# Patient Record
Sex: MALE | Race: WHITE | Employment: OTHER | URBAN - METROPOLITAN AREA
[De-identification: names, ages, dates, MRNs, and addresses within clinical notes are randomized per-mention and may not be internally consistent; named-entity substitution may affect disease eponyms.]

---

## 2018-01-01 ENCOUNTER — HOSPITAL ENCOUNTER (OUTPATIENT)
Dept: LAB | Age: 68
Discharge: HOME | End: 2018-12-21
Payer: MEDICARE

## 2018-01-01 ENCOUNTER — HOSPITAL ENCOUNTER (OUTPATIENT)
Dept: LAB | Age: 68
Discharge: HOME | End: 2018-11-06
Payer: MEDICARE

## 2018-01-01 ENCOUNTER — HOSPITAL ENCOUNTER (OUTPATIENT)
Dept: LAB | Age: 68
Discharge: HOME | End: 2018-12-05
Payer: MEDICARE

## 2018-01-01 DIAGNOSIS — I50.22 CHRONIC SYSTOLIC HEART FAILURE (HCC): ICD-10-CM

## 2018-01-01 DIAGNOSIS — I10 ESSENTIAL HYPERTENSION: ICD-10-CM

## 2018-01-01 DIAGNOSIS — I48.19 PERSISTENT ATRIAL FIBRILLATION (HCC): ICD-10-CM

## 2018-01-01 DIAGNOSIS — Z79.899 ENCOUNTER FOR LONG-TERM (CURRENT) USE OF HIGH-RISK MEDICATION: ICD-10-CM

## 2018-01-01 DIAGNOSIS — I95.1 ORTHOSTATIC HYPOTENSION: ICD-10-CM

## 2018-01-01 DIAGNOSIS — N18.30 CHRONIC KIDNEY DISEASE, STAGE III (MODERATE) (HCC): ICD-10-CM

## 2018-01-01 DIAGNOSIS — N18.9 CHRONIC KIDNEY DISEASE, UNSPECIFIED CKD STAGE: ICD-10-CM

## 2018-01-01 LAB
ANION GAP SERPL CALC-SCNC: 5 MMOL/L
ANION GAP SERPL CALC-SCNC: 7 MMOL/L
ANION GAP SERPL CALC-SCNC: 8 MMOL/L
BNP SERPL-MCNC: 1091 PG/ML
BNP SERPL-MCNC: 2552 PG/ML
BUN SERPL-MCNC: 43 MG/DL (ref 8–23)
BUN SERPL-MCNC: 46 MG/DL (ref 8–23)
BUN SERPL-MCNC: 47 MG/DL (ref 8–23)
CALCIUM SERPL-MCNC: 8.4 MG/DL (ref 8.3–10.4)
CALCIUM SERPL-MCNC: 8.7 MG/DL (ref 8.3–10.4)
CALCIUM SERPL-MCNC: 8.8 MG/DL (ref 8.3–10.4)
CHLORIDE SERPL-SCNC: 105 MMOL/L (ref 98–107)
CHLORIDE SERPL-SCNC: 107 MMOL/L (ref 98–107)
CHLORIDE SERPL-SCNC: 111 MMOL/L (ref 98–107)
CO2 SERPL-SCNC: 27 MMOL/L (ref 21–32)
CO2 SERPL-SCNC: 28 MMOL/L (ref 21–32)
CO2 SERPL-SCNC: 30 MMOL/L (ref 21–32)
CREAT SERPL-MCNC: 1.7 MG/DL (ref 0.8–1.5)
CREAT SERPL-MCNC: 1.9 MG/DL (ref 0.8–1.5)
CREAT SERPL-MCNC: 2 MG/DL (ref 0.8–1.5)
GLUCOSE SERPL-MCNC: 102 MG/DL (ref 65–100)
GLUCOSE SERPL-MCNC: 61 MG/DL (ref 65–100)
GLUCOSE SERPL-MCNC: 92 MG/DL (ref 65–100)
POTASSIUM SERPL-SCNC: 3.9 MMOL/L (ref 3.5–5.1)
POTASSIUM SERPL-SCNC: 4 MMOL/L (ref 3.5–5.1)
POTASSIUM SERPL-SCNC: 4.7 MMOL/L (ref 3.5–5.1)
SODIUM SERPL-SCNC: 140 MMOL/L (ref 136–145)
SODIUM SERPL-SCNC: 142 MMOL/L (ref 136–145)
SODIUM SERPL-SCNC: 146 MMOL/L (ref 136–145)

## 2018-01-01 PROCEDURE — 36415 COLL VENOUS BLD VENIPUNCTURE: CPT

## 2018-01-01 PROCEDURE — 83880 ASSAY OF NATRIURETIC PEPTIDE: CPT

## 2018-01-01 PROCEDURE — 80048 BASIC METABOLIC PNL TOTAL CA: CPT

## 2018-02-01 ENCOUNTER — HOSPITAL ENCOUNTER (OUTPATIENT)
Dept: LAB | Age: 68
Discharge: HOME | End: 2018-02-01
Payer: MEDICARE

## 2018-02-01 DIAGNOSIS — N18.30 STAGE 3 CHRONIC KIDNEY DISEASE (HCC): ICD-10-CM

## 2018-02-01 DIAGNOSIS — R07.89 OTHER CHEST PAIN: ICD-10-CM

## 2018-02-01 PROBLEM — I48.19 PERSISTENT ATRIAL FIBRILLATION (HCC): Status: ACTIVE | Noted: 2018-02-01

## 2018-02-01 LAB
ANION GAP SERPL CALC-SCNC: 6 MMOL/L
BASOPHILS # BLD: 0 K/UL (ref 0–0.2)
BASOPHILS NFR BLD: 0 % (ref 0–2)
BUN SERPL-MCNC: 35 MG/DL (ref 8–23)
CALCIUM SERPL-MCNC: 8.5 MG/DL (ref 8.3–10.4)
CHLORIDE SERPL-SCNC: 107 MMOL/L (ref 98–107)
CO2 SERPL-SCNC: 29 MMOL/L (ref 21–32)
CREAT SERPL-MCNC: 1.7 MG/DL (ref 0.8–1.5)
DIFFERENTIAL METHOD BLD: ABNORMAL
EOSINOPHIL # BLD: 0.2 K/UL (ref 0–0.8)
EOSINOPHIL NFR BLD: 2 % (ref 0.5–7.8)
ERYTHROCYTE [DISTWIDTH] IN BLOOD BY AUTOMATED COUNT: 13 % (ref 11.9–14.6)
GLUCOSE SERPL-MCNC: 105 MG/DL (ref 65–100)
HCT VFR BLD AUTO: 41.2 % (ref 41.1–50.3)
HGB BLD-MCNC: 14.4 G/DL (ref 13.6–17.2)
LYMPHOCYTES # BLD: 1.1 K/UL (ref 0.5–4.6)
LYMPHOCYTES NFR BLD: 14 % (ref 13–44)
MCH RBC QN AUTO: 32.7 PG (ref 26.1–32.9)
MCHC RBC AUTO-ENTMCNC: 35 G/DL (ref 31.4–35)
MCV RBC AUTO: 93.4 FL (ref 79.6–97.8)
MONOCYTES # BLD: 1 K/UL (ref 0.1–1.3)
MONOCYTES NFR BLD: 13 % (ref 4–12)
NEUTS SEG # BLD: 5.1 K/UL (ref 1.7–8.2)
NEUTS SEG NFR BLD: 71 % (ref 43–78)
PLATELET # BLD AUTO: 152 K/UL (ref 150–450)
PMV BLD AUTO: 10.8 FL (ref 10.8–14.1)
POTASSIUM SERPL-SCNC: 4 MMOL/L (ref 3.5–5.1)
RBC # BLD AUTO: 4.41 M/UL (ref 4.23–5.67)
SODIUM SERPL-SCNC: 142 MMOL/L (ref 136–145)
WBC # BLD AUTO: 7.3 K/UL (ref 4.3–11.1)

## 2018-02-01 PROCEDURE — 36415 COLL VENOUS BLD VENIPUNCTURE: CPT | Performed by: INTERNAL MEDICINE

## 2018-02-01 PROCEDURE — 80048 BASIC METABOLIC PNL TOTAL CA: CPT | Performed by: INTERNAL MEDICINE

## 2018-02-01 PROCEDURE — 85025 COMPLETE CBC W/AUTO DIFF WBC: CPT | Performed by: INTERNAL MEDICINE

## 2018-02-02 NOTE — PROGRESS NOTES
Left message on voice mail for Paula Sahni at [facility] that Dr. Sorensen Courts requests pt arrive early for cath for hydration due to elevated creatine. Requested Paula Sahni call this triage nurse to confirm that she received message. Advised pt of need to arrive early for cath due to elevated creatinine. Advised pt that someone will be calling to give him further instructions for 2/5/18 cath. Pt verbalized understanding.

## 2018-02-02 NOTE — PROGRESS NOTES
Obinna Upton in [facility] of ana Rodríguez Ven[place] verbalized understanding, and states pt's cath has been moved to 12:30 pm on 2/5/18. Pt should arrive at 7:00 am for hydration. Advised pt to arrive at [facility] at 7:00 an ib 2/5/18 for hydration prior to 12:30 pm cath. Pt verbalized understanding.

## 2018-02-03 NOTE — PROGRESS NOTES
Patient pre-assessment complete for [facility] poss with Dr Marilyn Hester scheduled for 18 at 12:30, arrival time 7am - HYDRATION. Patient verified using . Patient instructed to bring all home medications in labeled bottles on the day of procedure. NPO status reinforced. Patient informed to take a full dose aspirin 325mg  or 81 mg x 4 on the day of procedure. Patient instructed to HOLD spironolactone on Monday am. Instructed they can take all other medications excluding vitamins & supplements. Patient verbalizes understanding of all instructions & denies any questions at this time.

## 2018-02-05 ENCOUNTER — HOSPITAL ENCOUNTER (OUTPATIENT)
Dept: CARDIAC CATH/INVASIVE PROCEDURES | Age: 68
Discharge: HOME | End: 2018-02-05
Attending: INTERNAL MEDICINE | Admitting: INTERNAL MEDICINE
Payer: MEDICARE

## 2018-02-05 VITALS
HEART RATE: 90 BPM | SYSTOLIC BLOOD PRESSURE: 149 MMHG | HEIGHT: 70 IN | OXYGEN SATURATION: 95 % | DIASTOLIC BLOOD PRESSURE: 88 MMHG | RESPIRATION RATE: 14 BRPM | WEIGHT: 187 LBS | TEMPERATURE: 97.8 F | BODY MASS INDEX: 26.77 KG/M2

## 2018-02-05 LAB
ANION GAP SERPL CALC-SCNC: 11 MMOL/L (ref 7–16)
BUN SERPL-MCNC: 28 MG/DL (ref 8–23)
CALCIUM SERPL-MCNC: 8.4 MG/DL (ref 8.3–10.4)
CHLORIDE SERPL-SCNC: 108 MMOL/L (ref 98–107)
CO2 SERPL-SCNC: 23 MMOL/L (ref 21–32)
CREAT SERPL-MCNC: 1.55 MG/DL (ref 0.8–1.5)
GLUCOSE SERPL-MCNC: 106 MG/DL (ref 65–100)
POTASSIUM SERPL-SCNC: 4.1 MMOL/L (ref 3.5–5.1)
SODIUM SERPL-SCNC: 142 MMOL/L (ref 136–145)

## 2018-02-05 PROCEDURE — 85347 COAGULATION TIME ACTIVATED: CPT

## 2018-02-05 PROCEDURE — 77030019569 HC BND COMPR RAD TERU -B

## 2018-02-05 PROCEDURE — 80048 BASIC METABOLIC PNL TOTAL CA: CPT | Performed by: INTERNAL MEDICINE

## 2018-02-05 PROCEDURE — 74011250636 HC RX REV CODE- 250/636

## 2018-02-05 PROCEDURE — 74011250636 HC RX REV CODE- 250/636: Performed by: INTERNAL MEDICINE

## 2018-02-05 PROCEDURE — 93571 IV DOP VEL&/PRESS C FLO 1ST: CPT

## 2018-02-05 PROCEDURE — C1769 GUIDE WIRE: HCPCS

## 2018-02-05 PROCEDURE — 93458 L HRT ARTERY/VENTRICLE ANGIO: CPT

## 2018-02-05 PROCEDURE — 93572 IV DOP VEL&/PRESS C FLO EA: CPT

## 2018-02-05 PROCEDURE — 77030015766

## 2018-02-05 PROCEDURE — C1894 INTRO/SHEATH, NON-LASER: HCPCS

## 2018-02-05 PROCEDURE — 74011000250 HC RX REV CODE- 250: Performed by: INTERNAL MEDICINE

## 2018-02-05 PROCEDURE — 99153 MOD SED SAME PHYS/QHP EA: CPT

## 2018-02-05 PROCEDURE — 99152 MOD SED SAME PHYS/QHP 5/>YRS: CPT

## 2018-02-05 PROCEDURE — C1887 CATHETER, GUIDING: HCPCS

## 2018-02-05 PROCEDURE — 74011636320 HC RX REV CODE- 636/320: Performed by: INTERNAL MEDICINE

## 2018-02-05 PROCEDURE — 77030004534 HC CATH ANGI DX INFN CARD -A

## 2018-02-05 RX ORDER — ACETAMINOPHEN 325 MG/1
650 TABLET ORAL
Status: CANCELLED | OUTPATIENT
Start: 2018-02-05

## 2018-02-05 RX ORDER — SODIUM CHLORIDE 9 MG/ML
1 INJECTION, SOLUTION INTRAVENOUS AS NEEDED
Status: DISCONTINUED | OUTPATIENT
Start: 2018-02-05 | End: 2018-02-05 | Stop reason: HOSPADM

## 2018-02-05 RX ORDER — FENTANYL CITRATE 50 UG/ML
25-100 INJECTION, SOLUTION INTRAMUSCULAR; INTRAVENOUS
Status: DISCONTINUED | OUTPATIENT
Start: 2018-02-05 | End: 2018-02-05 | Stop reason: HOSPADM

## 2018-02-05 RX ORDER — HEPARIN SODIUM 200 [USP'U]/100ML
3 INJECTION, SOLUTION INTRAVENOUS CONTINUOUS
Status: DISCONTINUED | OUTPATIENT
Start: 2018-02-05 | End: 2018-02-05 | Stop reason: HOSPADM

## 2018-02-05 RX ORDER — HEPARIN SODIUM 10000 [USP'U]/ML
40-80 INJECTION, SOLUTION INTRAVENOUS; SUBCUTANEOUS
Status: DISCONTINUED | OUTPATIENT
Start: 2018-02-05 | End: 2018-02-05 | Stop reason: HOSPADM

## 2018-02-05 RX ORDER — SODIUM CHLORIDE 0.9 % (FLUSH) 0.9 %
5-10 SYRINGE (ML) INJECTION AS NEEDED
Status: CANCELLED | OUTPATIENT
Start: 2018-02-05

## 2018-02-05 RX ORDER — METOPROLOL TARTRATE 5 MG/5ML
2.5-5 INJECTION INTRAVENOUS
Status: DISCONTINUED | OUTPATIENT
Start: 2018-02-05 | End: 2018-02-05 | Stop reason: HOSPADM

## 2018-02-05 RX ORDER — SODIUM CHLORIDE 0.9 % (FLUSH) 0.9 %
5-10 SYRINGE (ML) INJECTION EVERY 8 HOURS
Status: CANCELLED | OUTPATIENT
Start: 2018-02-05

## 2018-02-05 RX ORDER — DIAZEPAM 5 MG/1
5 TABLET ORAL ONCE
Status: DISCONTINUED | OUTPATIENT
Start: 2018-02-05 | End: 2018-02-05 | Stop reason: HOSPADM

## 2018-02-05 RX ORDER — SODIUM CHLORIDE 9 MG/ML
75 INJECTION, SOLUTION INTRAVENOUS CONTINUOUS
Status: CANCELLED | OUTPATIENT
Start: 2018-02-05

## 2018-02-05 RX ORDER — LIDOCAINE HYDROCHLORIDE 20 MG/ML
1-20 INJECTION, SOLUTION INFILTRATION; PERINEURAL
Status: DISCONTINUED | OUTPATIENT
Start: 2018-02-05 | End: 2018-02-05 | Stop reason: HOSPADM

## 2018-02-05 RX ORDER — HYDROCODONE BITARTRATE AND ACETAMINOPHEN 5; 325 MG/1; MG/1
1 TABLET ORAL
Status: CANCELLED | OUTPATIENT
Start: 2018-02-05

## 2018-02-05 RX ORDER — MIDAZOLAM HYDROCHLORIDE 1 MG/ML
.5-5 INJECTION, SOLUTION INTRAMUSCULAR; INTRAVENOUS
Status: DISCONTINUED | OUTPATIENT
Start: 2018-02-05 | End: 2018-02-05 | Stop reason: HOSPADM

## 2018-02-05 RX ORDER — ONDANSETRON 2 MG/ML
4 INJECTION INTRAMUSCULAR; INTRAVENOUS
Status: CANCELLED | OUTPATIENT
Start: 2018-02-05

## 2018-02-05 RX ORDER — GUAIFENESIN 100 MG/5ML
81-324 LIQUID (ML) ORAL ONCE
Status: DISCONTINUED | OUTPATIENT
Start: 2018-02-05 | End: 2018-02-05 | Stop reason: HOSPADM

## 2018-02-05 RX ADMIN — METOPROLOL TARTRATE 5 MG: 5 INJECTION INTRAVENOUS at 13:34

## 2018-02-05 RX ADMIN — FENTANYL CITRATE 25 MCG: 50 INJECTION, SOLUTION INTRAMUSCULAR; INTRAVENOUS at 13:30

## 2018-02-05 RX ADMIN — SODIUM CHLORIDE 1 ML/KG/HR: 900 INJECTION, SOLUTION INTRAVENOUS at 07:24

## 2018-02-05 RX ADMIN — IOPAMIDOL 75 ML: 755 INJECTION, SOLUTION INTRAVENOUS at 13:58

## 2018-02-05 RX ADMIN — MIDAZOLAM HYDROCHLORIDE 2 MG: 1 INJECTION, SOLUTION INTRAMUSCULAR; INTRAVENOUS at 13:30

## 2018-02-05 RX ADMIN — HEPARIN SODIUM 2 ML: 10000 INJECTION, SOLUTION INTRAVENOUS; SUBCUTANEOUS at 13:57

## 2018-02-05 RX ADMIN — METOPROLOL TARTRATE 5 MG: 5 INJECTION INTRAVENOUS at 13:37

## 2018-02-05 RX ADMIN — LIDOCAINE HYDROCHLORIDE 60 MG: 20 INJECTION, SOLUTION INFILTRATION; PERINEURAL at 13:56

## 2018-02-05 RX ADMIN — HEPARIN 3 ML/HR: 100 SYRINGE at 13:56

## 2018-02-05 RX ADMIN — HEPARIN SODIUM 6000 UNITS: 10000 INJECTION, SOLUTION INTRAVENOUS; SUBCUTANEOUS at 13:39

## 2018-02-05 NOTE — PROGRESS NOTES
TRANSFER - OUT REPORT:    Verbal report given to SHALONDA Matute(name) on Marybeth Parekh  being transferred to cpru(unit) for routine progression of care       Report consisted of patients Situation, Background, Assessment and   Recommendations(SBAR). Information from the following report(s) SBAR was reviewed with the receiving nurse.    has No Known Allergies. Opportunity for questions and clarification was provided. Procedure Summary:Pt had LHC with iFR x 3 via R wrist, site sealed with R band using 12 ml at 1355 hrs.     Med Administration    Versed:  2 mg  Fentanyl: 25 mcg  Heparin: 6000 units      Visit Vitals    /72 (BP 1 Location: Left arm, BP Patient Position: Supine)    Pulse 90    Temp 97.8 °F (36.6 °C)    Resp 14    Ht 5' 10\" (1.778 m)    Wt 84.8 kg (187 lb)    SpO2 97%    BMI 26.83 kg/m2     Past Medical History:   Diagnosis Date    Abnormal stress echo-suggested anterior ischemia 5/12/2011    Arrhythmia     Arthritis     knees    CAD (coronary artery disease)     PCI-2011    Chronic kidney disease     Chronic systolic heart failure (HCC)     CKD (chronic kidney disease)     Dyskinesia 2/17/2016    GERD (gastroesophageal reflux disease)     Heart failure (HCC)     Hepatitis     History of    HTN (hypertension)     Hyperlipidemia     ICD (implantable cardiac defibrillator) in place     [place] dual-chamber ICD impalnted 8/28/12     Ill-defined condition     Parkinsons    Nonischemic cardiomyopathy ([facility] [address] 8/29/2012    Organic hypersomnia, unspecified     Organic sleep apnea, unspecified     Orthostatic hypotension 2/17/2016    MARICHUY (obstructive sleep apnea)     cpap    Parkinson disease ([address]            Peripheral IV 02/05/18 Right Hand (Active)

## 2018-02-05 NOTE — PROGRESS NOTES
Report received from [facility] RN. Procedural findings communicated. Intra procedural  medication administration reviewed. Progression of care discussed.      Patient received into [facility] [place] 4 post sheath removal.     Right Radial access site without bleeding or swelling     TR band dry and intact     Patient instructed to limit movement to right upper extremity    Routine post procedural vital signs and site assessment initiated

## 2018-02-05 NOTE — PROGRESS NOTES
Patient received to [address] [place] [address] room # 4  Ambulatory from [facility]. Patient scheduled for LHC today with Dr Lance Farris. Procedure reviewed & questions answered, voiced good understanding consent obtained & placed on chart. All medications and medical history reviewed. Will prep patient per orders. Patient & family updated on plan of care.

## 2018-02-05 NOTE — PROGRESS NOTES
Discharge instructions given per orders, voiced good understanding of post radial cath care, medications & follow up care.  Denies any questions discharged to home with family

## 2018-02-05 NOTE — PROCEDURES
[address] [facility] CATH    Name:SCHMIDT, Colletta Dess  MR#: [id number]  : 1950  ACCOUNT #: [de-identified]   DATE OF SERVICE: 2018    PROCEDURE:    1. Left heart catheterization, selective coronary arteriography. No left ventriculogram was performed given the need to conserve contrast load in a patient with renal insufficiency. 2.  IFR of LAD. 3.  IFR of left circumflex. 4.  IFR of first obtuse marginal.     INDICATION:    1. Recent episode of chest pain in a patient with known coronary artery disease and remote LAD stenting. Chest pain resolved with nitroglycerin. Concerns of unstable angina. 2.  Assess hemodynamic significance of eccentric lesions of the LAD, obtuse marginal and circumflex. TECHNICAL FACTORS:  After informed consent was obtained, patient was brought to the cardiac catheterization suite. The right radial artery was prepped and draped in the usual sterile fashion. Utilizing modified Seldinger technique and micropuncture needle, the right radial artery was entered. A 6-Salvadorean Terumo slender sheath was placed without difficulty. A radial cocktail consisting of 2000 units of heparin, 2 mg of verapamil and 200 mcg of nitroglycerin was administered. A 5-Salvadorean Tiger 4.0 catheter was used to selectively engage the ostium of left main coronary artery, then right coronary artery respectively. Selective injection in various projections performed. The Tiger catheter was then used to cross the aortic valve and enter the left ventricle. Hemodynamic measurements were obtained. No left ventriculogram was obtained given the patient's renal insufficiency. Left ventricular aortic pressure gradient was obtained by pullback technique. At this point, the patient was given an additional 6000 units of heparin. ACT was 334. XB 3.0 guide was used to selectively engage the ostium of left main coronary artery.   A [email or web address] pressure wire system was advanced after 150 mcg intracoronary nitroglycerin was administered. The wire was placed in the ostium of left main coronary artery and appropriately normalized. This was then placed in the mid LAD. IFR at this point was 0.96 indicating hemodynamically insignificant stenosis of the LAD. The wire was pulled back and again noted to be normalized in the left main coronary artery. This was then placed in the distal circumflex. IFR of the distal circumflex was 1.0. This indicated hemodynamically insignificant stenosis. Wire was then pulled back and again noted to be normalized in the left main coronary artery. This was then placed in the distal first obtuse marginal and IFR was 0.97. Again, this was felt to be a hemodynamically insignificant stenosis. Based on this, PCI was deferred. The wire was removed and final projections were obtained. At this point, the sheath was removed and a pneumatic band was placed with excellent hemostasis. No complications were encountered. SEDATION:  The patient received 2 mg of Versed and 25 mcg of fentanyl. Sedation start time was 1328 with a procedure completion time of 1358. CONTRAST:  Isovue 75 mL. HEMODYNAMIC RESULTS:  1. Aortic pressure 120/77. 2.  Left ventricular end-diastolic pressure is 22.  3.  There is no significant gradient across the aortic valve. ANGIOGRAPHIC RESULTS:  1. Left main coronary artery is a medium-caliber vessel. It appears to be patent. 2.  LAD:  Contains a prior implanted stent. Just proximal to the stent is a 50% stenosis which is smooth in appearance. This is in the proximal vessel. The distal vessel contains 20% luminal irregularities. Stent is widely patent. 3.  First diagonal artery:  Small-caliber vessel, patent. 4.  Second diagonal:  Small-caliber vessel, patent. 5.  Third diagonal artery:  Small-caliber vessel, 20% ostial stenosis.   6.  Left circumflex is a medium-caliber codominant vessel, contains a focal 60% to 70% mid stenosis. The distal vessel is patent. 7.  First obtuse marginal artery is a medium-caliber vessel. It bifurcates into a superior and inferior branch. Superior branch contains 20% stenosis. The inferior branch contains an eccentric 60% stenosis. 8.  Second obtuse marginal artery:  Small-caliber vessel. 9.  Left posterolateral branch 1 and 2 are small-caliber vessels, patent. 10.  Right coronary artery is a medium-caliber, codominant vessel. 20% proximal, 20% mid stenosis. 11.  Right PDA:  Small-caliber vessel, patent. 12.  LV gram:  Not performed. 13. IFR of LAD 0.96.  14.  IFR of left circumflex is 1.0.  15. IFR of first obtuse marginal is 0.97. CONCLUSION:    1.  Multivessel coronary artery disease which is moderate in nature. Not hemodynamically significant based on IFR assessment. 2.  Patent left anterior descending stent. 3. No LV gram was performed given the patient's renal insufficiency. 4.  Successful IFR of LAD with an IFR measurement of 0.96. Successful IFR measurement of left circumflex with IFR measurement of 1.0. Successful IFR measurement of the first obtuse marginal artery with IFR of 0.97. All these measurements were hemodynamically insignificant and medical therapy was recommended.       MD OLGA Martínez / [place]  D: 02/05/2018 14:18     T: 02/05/2018 15:05  JOB #: [id number]  CC: Priscila Barrientos MD

## 2018-02-05 NOTE — DISCHARGE INSTRUCTIONS

## 2018-02-05 NOTE — PROCEDURES
Brief Cardiac Procedure Note    Patient: Samara Lea MRN: [id number]  SSN: [id number]    YOB: 1950  Age: 69 y.o. Sex: male      Date of Procedure: 2/5/2018     Pre-procedure Diagnosis: Chest pain     Post-procedure Diagnosis: Coronary Artery Disease    Procedure: Left Heart Catheterization. IFR    Brief Description of Procedure: As above    Performed By: Elayne Ruff MD     Assistants: None    Anesthesia: Moderate Sedation    Estimated Blood Loss: Less than 10 mL      Specimens: None    Implants: None    Findings: Moderate CAD. Normal IFR LAD, LCx and OM1    Complications: None    Recommendations: Continue medical therapy.     Signed By: Elayne Ruff MD     February 5, 2018

## 2018-02-05 NOTE — IP AVS SNAPSHOT
[address] 
[phone] Patient: Fredrick Isaac MRN: [id number] Discharge Summary 2/5/2018 Fredrick Isaac MRN[de-identified]  [id number] Admission Information Provider Pager Service Admission Date Expected D/C Date Jamil Man MD  CARDIAC CATH LAB 2/5/2018 Actual LOS Patient Class 0 days OUTPATIENT Follow-up Information Follow up With Details Comments Contact Info Jhonny Herrera MD  Follow with Dr Kahlil Guajardo on Monday Fwbruary 12th at 1:45pm in the [place] [facility] [address] [place] [address] 
[phone] My Medications ASK your physician about these medications Instructions Each Dose to Equal  
 Morning Noon Evening Bedtime  
 aspirin delayed-release 81 mg tablet Your last dose was: Your next dose is: Take  by mouth daily. carbidopa-levodopa  mg per tablet Commonly known as:  SINEMET Your last dose was: Your next dose is:    
   
   
 1.5 tabs 8am, 1pm, 6pm and 10pm  
     
   
   
   
  
 carvedilol 6.25 mg tablet Commonly known as:  Delphina [place] Your last dose was: Your next dose is: Take 1 Tab by mouth two (2) times a day. 6.25 mg  
    
   
   
   
  
 clopidogrel 75 mg Tab Commonly known as:  PLAVIX Your last dose was: Your next dose is: Take 1 Tab by mouth daily. 75 mg  
    
   
   
   
  
 cpap machine kit Your last dose was: Your next dose is:    
   
   
 by Does Not Apply route. 10cm  
     
   
   
   
  
 cyanocobalamin 1,000 mcg tablet Your last dose was: Your next dose is: Take 1,000 mcg by mouth daily. 1000 mcg  
    
   
   
   
  
 nitroglycerin 0.4 mg SL tablet Commonly known as:  NITROSTAT Your last dose was: Your next dose is:    
   
   
 1 Tab by SubLINGual route every five (5) minutes as needed. 0.4 mg  
    
   
   
   
  
 pitavastatin calcium 2 mg tablet Commonly known as:  LIVALO Your last dose was: Your next dose is: Take 1 Tab by mouth daily. 2 mg  
    
   
   
   
  
 rasagiline 1 mg tablet Commonly known as:  AZILECT Your last dose was: Your next dose is: Take 1 Tab by mouth daily. 1 mg  
    
   
   
   
  
 spironolactone 25 mg tablet Commonly known as:  ALDACTONE Your last dose was: Your next dose is: Take 0.5 Tabs by mouth daily. 12.5 mg  
    
   
   
   
  
 valsartan 40 mg tablet Commonly known as:  DIOVAN Your last dose was: Your next dose is: Take 0.5 Tabs by mouth daily. 20 mg  
    
   
   
   
  
 VITAMIN D3 2,000 unit Tab Generic drug:  cholecalciferol (vitamin D3) Your last dose was: Your next dose is: Take  by mouth daily. General Information Please provide this summary of care documentation to your next provider. Allergies No Known Allergies Current Immunizations  Reviewed on 3/6/2017 Name Date Influenza High Dose Vaccine PF 1/2/2017, 10/22/2015 Influenza Vaccine 9/24/2013 Pneumococcal Conjugate (PCV-13) 1/18/2016 Pneumococcal Polysaccharide (PPSV-23) 3/6/2017, 1/18/2011 Tdap 12/11/2011 ZZZ-RETIRED (DO NOT USE) Pneumococcal Vaccine (Unspecified Type) 8/29/2010 Zoster Vaccine, Live 12/8/2011 Discharge Instructions Discharge Instructions HEART CATHETERIZATION/ANGIOGRAPHY DISCHARGE INSTRUCTIONS 1. Check puncture site frequently for swelling or bleeding.  If there is any bleeding, apply pressure over the area with a clean towel or washcloth. If you are unable to stop the bleeding in 15-20 minutes call 911. Notify your doctor for any redness, swelling, drainage, or oozing from the puncture site. Notify your doctor for any fever, chills or other signs of infection. 2. If the extremity becomes cold, numb, or painful call [facility] at [phone]. 
3. Activity should be limited for the next 48 hours. Avoid pushing, pulling, or strenuous activity for 48 hours. No heavy lifting (anything over 5 pounds) for 3 days. No driving for 48 hours. 4. You may resume your usual diet. Drink more fluids than usual, water is best. 
5. Have a responsible person drive you home and stay with you for at least 24 hours after your heart catheterization/angiography. 6. You may remove bandage from your right wrist in 24 hours. You may shower in 24 hours. No tub baths, hot tubs, or swimming for 1 week. Do not wash dishes for 1 week. Do not place any lotions, creams, powders, or ointments over puncture site for 1 week. You may place a clean band-aid over the puncture site each day for 5 days. Change daily. I have read the above instructions and have had the opportunity to ask questions. Discharge Orders None  
  
` Patient Signature:  ____________________________________________________________ Date:  ____________________________________________________________  
  
 W[facility] Favorite Provider Signature:  ____________________________________________________________ Date:  ____________________________________________________________

## 2018-02-06 LAB — ACT BLD: 334 SECS (ref 70–128)

## 2018-02-14 NOTE — PROGRESS NOTES
Patient pre-assessment complete for ANNIA-Cardioversion with DR Jenni Marrero scheduled for 2/15/18 at 9am, arrival time 7am. Patient verified using . Patient instructed to bring all home medications in labeled bottles on the day of procedure. NPO status reinforced. Patient instructed to HOLD spironolactone in am. Instructed they can take all other medications excluding vitamins & supplements. Patient verbalizes understanding of all instructions & denies any questions at this time.

## 2018-02-15 ENCOUNTER — ANESTHESIA EVENT (OUTPATIENT)
Dept: SURGERY | Age: 68
End: 2018-02-15
Payer: MEDICARE

## 2018-02-15 ENCOUNTER — ANESTHESIA (OUTPATIENT)
Dept: SURGERY | Age: 68
End: 2018-02-15
Payer: MEDICARE

## 2018-02-15 ENCOUNTER — HOSPITAL ENCOUNTER (OUTPATIENT)
Age: 68
Setting detail: OUTPATIENT SURGERY
Discharge: HOME | End: 2018-02-15
Attending: INTERNAL MEDICINE | Admitting: INTERNAL MEDICINE
Payer: MEDICARE

## 2018-02-15 ENCOUNTER — APPOINTMENT (OUTPATIENT)
Dept: CARDIAC CATH/INVASIVE PROCEDURES | Age: 68
End: 2018-02-15
Payer: MEDICARE

## 2018-02-15 VITALS
DIASTOLIC BLOOD PRESSURE: 73 MMHG | HEIGHT: 70 IN | TEMPERATURE: 97.8 F | BODY MASS INDEX: 26.92 KG/M2 | RESPIRATION RATE: 16 BRPM | SYSTOLIC BLOOD PRESSURE: 115 MMHG | HEART RATE: 70 BPM | WEIGHT: 188 LBS | OXYGEN SATURATION: 92 %

## 2018-02-15 LAB
ANION GAP SERPL CALC-SCNC: 8 MMOL/L (ref 7–16)
ATRIAL RATE: 340 BPM
BUN SERPL-MCNC: 42 MG/DL (ref 8–23)
CALCIUM SERPL-MCNC: 8.5 MG/DL (ref 8.3–10.4)
CALCULATED R AXIS, ECG10: 82 DEGREES
CALCULATED T AXIS, ECG11: 39 DEGREES
CHLORIDE SERPL-SCNC: 110 MMOL/L (ref 98–107)
CO2 SERPL-SCNC: 27 MMOL/L (ref 21–32)
CREAT SERPL-MCNC: 1.73 MG/DL (ref 0.8–1.5)
DIAGNOSIS, 93000: NORMAL
ERYTHROCYTE [DISTWIDTH] IN BLOOD BY AUTOMATED COUNT: 14.2 % (ref 11.9–14.6)
GLUCOSE SERPL-MCNC: 113 MG/DL (ref 65–100)
HCT VFR BLD AUTO: 43.6 % (ref 41.1–50.3)
HGB BLD-MCNC: 15.1 G/DL (ref 13.6–17.2)
INR PPP: 1.6
MAGNESIUM SERPL-MCNC: 2.2 MG/DL (ref 1.8–2.4)
MCH RBC QN AUTO: 32.7 PG (ref 26.1–32.9)
MCHC RBC AUTO-ENTMCNC: 34.6 G/DL (ref 31.4–35)
MCV RBC AUTO: 94.4 FL (ref 79.6–97.8)
PLATELET # BLD AUTO: 151 K/UL (ref 150–450)
PMV BLD AUTO: 11.3 FL (ref 10.8–14.1)
POTASSIUM SERPL-SCNC: 4 MMOL/L (ref 3.5–5.1)
PROTHROMBIN TIME: 18.4 SEC (ref 11.5–14.5)
Q-T INTERVAL, ECG07: 408 MS
QRS DURATION, ECG06: 134 MS
QTC CALCULATION (BEZET), ECG08: 526 MS
RBC # BLD AUTO: 4.62 M/UL (ref 4.23–5.67)
SODIUM SERPL-SCNC: 145 MMOL/L (ref 136–145)
VENTRICULAR RATE, ECG03: 100 BPM
WBC # BLD AUTO: 7.3 K/UL (ref 4.3–11.1)

## 2018-02-15 PROCEDURE — 85027 COMPLETE CBC AUTOMATED: CPT | Performed by: INTERNAL MEDICINE

## 2018-02-15 PROCEDURE — 76060000032 HC ANESTHESIA 0.5 TO 1 HR: Performed by: INTERNAL MEDICINE

## 2018-02-15 PROCEDURE — 85610 PROTHROMBIN TIME: CPT | Performed by: INTERNAL MEDICINE

## 2018-02-15 PROCEDURE — 83735 ASSAY OF MAGNESIUM: CPT | Performed by: INTERNAL MEDICINE

## 2018-02-15 PROCEDURE — 74011250636 HC RX REV CODE- 250/636

## 2018-02-15 PROCEDURE — 93005 ELECTROCARDIOGRAM TRACING: CPT | Performed by: INTERNAL MEDICINE

## 2018-02-15 PROCEDURE — 93312 ECHO TRANSESOPHAGEAL: CPT

## 2018-02-15 PROCEDURE — 80048 BASIC METABOLIC PNL TOTAL CA: CPT | Performed by: INTERNAL MEDICINE

## 2018-02-15 PROCEDURE — 92960 CARDIOVERSION ELECTRIC EXT: CPT

## 2018-02-15 PROCEDURE — 93283 PRGRMG EVAL IMPLANTABLE DFB: CPT

## 2018-02-15 RX ORDER — PROPOFOL 10 MG/ML
INJECTION, EMULSION INTRAVENOUS AS NEEDED
Status: DISCONTINUED | OUTPATIENT
Start: 2018-02-15 | End: 2018-02-15 | Stop reason: HOSPADM

## 2018-02-15 RX ORDER — SODIUM CHLORIDE, SODIUM LACTATE, POTASSIUM CHLORIDE, CALCIUM CHLORIDE 600; 310; 30; 20 MG/100ML; MG/100ML; MG/100ML; MG/100ML
INJECTION, SOLUTION INTRAVENOUS
Status: DISCONTINUED | OUTPATIENT
Start: 2018-02-15 | End: 2018-02-15 | Stop reason: HOSPADM

## 2018-02-15 RX ADMIN — PROPOFOL 10 MG: 10 INJECTION, EMULSION INTRAVENOUS at 08:44

## 2018-02-15 RX ADMIN — SODIUM CHLORIDE, SODIUM LACTATE, POTASSIUM CHLORIDE, CALCIUM CHLORIDE: 600; 310; 30; 20 INJECTION, SOLUTION INTRAVENOUS at 08:32

## 2018-02-15 RX ADMIN — PROPOFOL 10 MG: 10 INJECTION, EMULSION INTRAVENOUS at 08:48

## 2018-02-15 RX ADMIN — PROPOFOL 30 MG: 10 INJECTION, EMULSION INTRAVENOUS at 08:40

## 2018-02-15 NOTE — PROGRESS NOTES
Report received from Dorothy Waite, [address]. Procedural findings communicated. Intra procedural  medication administration reviewed. Progression of care discussed.      Patient received into [address] post sheath removal.     Routine post procedural vital signs and site assessment initiated yes

## 2018-02-15 NOTE — PROGRESS NOTES
Patient up to bedside, vital signs and site stable. Patient ambulated to bathroom without difficulty. Patient voided without difficulty. Vascular site stable. 3673 Discharge instructions and home medications reviewed with patient. Time allowed for questions and answers. 8453 Patient ambulated second time without difficulty. Site stable after ambulation. Peripheral IV sites dc'd without difficulty with tips intact. 1000 Patient discharged to home with family.

## 2018-02-15 NOTE — IP AVS SNAPSHOT
[address] 
[phone] Patient: Kem Arias MRN: [id number] Discharge Summary 2/15/2018 Kem Arias MRN[de-identified]  [id number] Admission Information Provider Pager Service Admission Date Expected D/C Date Stephen Oakes MD  CARDIAC CATH LAB 2/15/2018 2/15/2018 Actual LOS Patient Class 0 days OUTPATIENT SURGERY Follow-up Information Follow up With Details Comments Contact Info Rafael Garcia MD   [address] [place] [id number] 
[phone] Stephen Oakes MD On 3/15/2018 Follow up with Dr. Severiano Alma at [facility] on March 15th at [address], please call [phone] if any issue with date or time of appointment. [address] [facility] [address] 
[phone] My Medications TAKE these medications as instructed Instructions Each Dose to Equal  
 Morning Noon Evening Bedtime  
 amiodarone 200 mg tablet Commonly known as:  CORDARONE Your last dose was: Your next dose is: Take 1 Tab by mouth two (2) times daily as needed. 200 mg  
    
   
   
   
  
 apixaban 5 mg tablet Commonly known as:  Maya Heater Your last dose was: Your next dose is: Take 1 Tab by mouth two (2) times a day. 5 mg  
    
   
   
   
  
 aspirin delayed-release 81 mg tablet Your last dose was: Your next dose is: Take  by mouth daily. carbidopa-levodopa  mg per tablet Commonly known as:  SINEMET Your last dose was: Your next dose is:    
   
   
 1.5 tabs 8am, 1pm, 6pm and 10pm  
     
   
   
   
  
 carvedilol 6.25 mg tablet Commonly known as:  Barton Justyn Your last dose was: Your next dose is: Take 1 Tab by mouth two (2) times a day. 6.25 mg  
    
   
   
   
  
 cpap machine kit Your last dose was: Your next dose is:    
   
   
 by Does Not Apply route. 10cm  
     
   
   
   
  
 cyanocobalamin 1,000 mcg tablet Your last dose was: Your next dose is: Take 1,000 mcg by mouth daily. 1000 mcg  
    
   
   
   
  
 nitroglycerin 0.4 mg SL tablet Commonly known as:  NITROSTAT Your last dose was: Your next dose is:    
   
   
 1 Tab by SubLINGual route every five (5) minutes as needed. 0.4 mg  
    
   
   
   
  
 pitavastatin calcium 2 mg tablet Commonly known as:  LIVALO Your last dose was: Your next dose is: Take 1 Tab by mouth daily. 2 mg  
    
   
   
   
  
 rasagiline 1 mg tablet Commonly known as:  AZILECT Your last dose was: Your next dose is: Take 1 Tab by mouth daily. 1 mg  
    
   
   
   
  
 spironolactone 25 mg tablet Commonly known as:  ALDACTONE Your last dose was: Your next dose is: Take 0.5 Tabs by mouth daily. 12.5 mg  
    
   
   
   
  
 valsartan 40 mg tablet Commonly known as:  DIOVAN Your last dose was: Your next dose is: Take 0.5 Tabs by mouth daily. 20 mg  
    
   
   
   
  
 VITAMIN D3 2,000 unit Tab Generic drug:  cholecalciferol (vitamin D3) Your last dose was: Your next dose is: Take  by mouth daily. General Information Please provide this summary of care documentation to your next provider. Allergies No Known Allergies Current Immunizations  Reviewed on 3/6/2017 Name Date Influenza High Dose Vaccine PF 1/2/2017, 10/22/2015 Influenza Vaccine 9/24/2013 Pneumococcal Conjugate (PCV-13) 1/18/2016 Pneumococcal Polysaccharide (PPSV-23) 3/6/2017, 1/18/2011 Tdap 12/11/2011 ZZZ-RETIRED (DO NOT USE) Pneumococcal Vaccine (Unspecified Type) 8/29/2010 Zoster Vaccine, Live 12/8/2011 Discharge Instructions Discharge Instructions AFTER YOU TRANSESOPHAGEAL ECHOCARDIOGRAM 
 
Be sure someone else drives you home. You may feel drowsy for several hours. Do not eat or drink for at least two hours after your procedure (May eat/drink at 1030am). Your throat will be numb and there is a risk you might have difficulty swallowing for a while. Be careful when you do eat or drink for the first time especially with hot fluids since you could easily burn your throat. Call your doctor if: 
 
· You are bleeding from your throat or mouth. · You have trouble breathing all of a sudden. · You have chest pain or any pain that spreads to your neck, jaw, or arms. · You have questions or concerns. · You have a fever greater than 101°F. 
 
Doctor: Follow up with Dr. Randal Iverson at [facility] Cardiology on March 15th at [place] [address], please call [phone] if any issue with date or time of appointment. Special Instructions: 
 
No driving for 24 hours. Discharge Orders None  
  
` Patient Signature:  ____________________________________________________________ Date:  ____________________________________________________________  
  
 Shea Moritz Provider Signature:  ____________________________________________________________ Date:  ____________________________________________________________

## 2018-02-15 NOTE — DISCHARGE INSTRUCTIONS
AFTER YOU TRANSESOPHAGEAL ECHOCARDIOGRAM    Be sure someone else drives you home. You may feel drowsy for several hours. Do not eat or drink for at least two hours after your procedure (May eat/drink at 1030am). Your throat will be numb and there is a risk you might have difficulty swallowing for a while. Be careful when you do eat or drink for the first time especially with hot fluids since you could easily burn your throat. Call your doctor if:    · You are bleeding from your throat or mouth. · You have trouble breathing all of a sudden. · You have chest pain or any pain that spreads to your neck, jaw, or arms. · You have questions or concerns. · You have a fever greater than 101°F.    Doctor: Follow up with Dr. Briseno Friends at [facility]iology on March 15th at [address], please call [phone] if any issue with date or time of appointment. Special Instructions:    No driving for 24 hours.

## 2018-02-15 NOTE — PROGRESS NOTES
Patient received to [address] [place] [address] room # 5  Ambulatory from [facility]. Patient scheduled for ANNIA/CVN today with Dr Jenni Marrero. Procedure reviewed & questions answered, voiced good understanding consent obtained & placed on chart. All medications and medical history reviewed. Will prep patient per orders. Patient & family updated on plan of care.

## 2018-02-15 NOTE — ANESTHESIA PREPROCEDURE EVALUATION
Anesthetic History               Review of Systems / Medical History  Patient summary reviewed, nursing notes reviewed and pertinent labs reviewed    Pulmonary        Sleep apnea: CPAP           Neuro/Psych             Comments: Parkinson's dz Cardiovascular    Hypertension: well controlled        Dysrhythmias (paroxysmal a-fib)   Pacemaker (ICD placed 6 yrs ago), CAD (chronic systolic heart failure) and hyperlipidemia    Exercise tolerance: <4 METS  Comments: Nonischemic cardiomyopathy -- LVEF 40-45%   GI/Hepatic/Renal     GERD    Renal disease: CRI  Liver disease (Remote hx hepatitis)     Endo/Other        Arthritis     Other Findings            Physical Exam    Airway  Mallampati: III  TM Distance: > 6 cm  Neck ROM: decreased range of motion   Mouth opening: Normal     Cardiovascular    Rhythm: irregular  Rate: abnormal        Comments: Tachycardic Dental         Pulmonary  Breath sounds clear to auscultation               Abdominal  GI exam deferred       Other Findings            Anesthetic Plan    ASA: 4  Anesthesia type: total IV anesthesia            Anesthetic plan and risks discussed with: Patient and Spouse

## 2018-02-15 NOTE — ANESTHESIA POSTPROCEDURE EVALUATION
Post-Anesthesia Evaluation and Assessment    Patient: Braden Rubin MRN: [id number]  SSN: [id number]    YOB: 1950  Age: 69 y.o. Sex: male       Cardiovascular Function/Vital Signs  Visit Vitals    /72    Pulse 67    Temp 36.6 °C (97.8 °F)    Resp 14    Ht 5' 10\" (1.778 m)    Wt 85.3 kg (188 lb)    SpO2 94%    BMI 26.98 kg/m2       Patient is status post total IV anesthesia anesthesia for Procedure(s):  TRANSESOPHAGEAL ECHOCARDIOGRAPHY GUIDED CARDIOVERSION. Nausea/Vomiting: None    Postoperative hydration reviewed and adequate. Pain:  Pain Scale 1: Numeric (0 - 10) (02/15/18 0901)  Pain Intensity 1: 0 (02/15/18 0901)   Managed    Neurological Status: At baseline    Mental Status and Level of Consciousness: Arousable    Pulmonary Status:   O2 Device: Room air (02/15/18 0942)   Adequate oxygenation and airway patent    Complications related to anesthesia: None    Post-anesthesia assessment completed.  No concerns    Signed By: Lorrie Ambrose MD     February 15, 2018

## 2018-03-12 PROBLEM — R97.20 ELEVATED PSA: Status: ACTIVE | Noted: 2018-03-12

## 2018-03-12 PROBLEM — R07.89 OTHER CHEST PAIN: Status: RESOLVED | Noted: 2018-02-01 | Resolved: 2018-03-12

## 2018-03-12 PROBLEM — R79.89 ELEVATED TSH: Status: ACTIVE | Noted: 2018-03-12

## 2018-11-06 PROBLEM — Z79.01 ANTICOAGULANT LONG-TERM USE: Status: ACTIVE | Noted: 2018-01-01

## 2019-01-01 ENCOUNTER — HOSPITAL ENCOUNTER (OUTPATIENT)
Dept: LAB | Age: 69
Discharge: HOME | End: 2019-04-01
Payer: MEDICARE

## 2019-01-01 ENCOUNTER — HOSPITAL ENCOUNTER (OUTPATIENT)
Age: 69
Setting detail: OUTPATIENT SURGERY
Discharge: HOME | End: 2019-04-05
Attending: INTERNAL MEDICINE | Admitting: INTERNAL MEDICINE
Payer: MEDICARE

## 2019-01-01 ENCOUNTER — APPOINTMENT (OUTPATIENT)
Dept: GENERAL RADIOLOGY | Age: 69
DRG: 291 | End: 2019-01-01
Attending: EMERGENCY MEDICINE
Payer: MEDICARE

## 2019-01-01 ENCOUNTER — HOSPITAL ENCOUNTER (OUTPATIENT)
Dept: LAB | Age: 69
Discharge: HOME | End: 2019-03-26
Attending: INTERNAL MEDICINE
Payer: MEDICARE

## 2019-01-01 ENCOUNTER — HOSPITAL ENCOUNTER (OUTPATIENT)
Dept: LAB | Age: 69
Discharge: HOME | End: 2019-01-09
Payer: MEDICARE

## 2019-01-01 ENCOUNTER — APPOINTMENT (OUTPATIENT)
Dept: ULTRASOUND IMAGING | Age: 69
DRG: 291 | End: 2019-01-01
Attending: INTERNAL MEDICINE
Payer: MEDICARE

## 2019-01-01 ENCOUNTER — HOSPITAL ENCOUNTER (OUTPATIENT)
Dept: GENERAL RADIOLOGY | Age: 69
Discharge: HOME | End: 2019-04-01

## 2019-01-01 ENCOUNTER — HOSPITAL ENCOUNTER (OUTPATIENT)
Dept: LAB | Age: 69
Discharge: HOME | End: 2019-03-15
Payer: MEDICARE

## 2019-01-01 ENCOUNTER — HOSPITAL ENCOUNTER (INPATIENT)
Age: 69
LOS: 1 days | DRG: 291 | End: 2019-04-14
Attending: EMERGENCY MEDICINE | Admitting: INTERNAL MEDICINE
Payer: MEDICARE

## 2019-01-01 VITALS
DIASTOLIC BLOOD PRESSURE: 58 MMHG | BODY MASS INDEX: 25.76 KG/M2 | OXYGEN SATURATION: 93 % | SYSTOLIC BLOOD PRESSURE: 115 MMHG | HEART RATE: 150 BPM | WEIGHT: 170 LBS | TEMPERATURE: 97.9 F | HEIGHT: 68 IN | RESPIRATION RATE: 33 BRPM

## 2019-01-01 VITALS
HEIGHT: 68 IN | BODY MASS INDEX: 26.98 KG/M2 | RESPIRATION RATE: 18 BRPM | HEART RATE: 84 BPM | SYSTOLIC BLOOD PRESSURE: 136 MMHG | OXYGEN SATURATION: 97 % | TEMPERATURE: 98 F | WEIGHT: 178 LBS | DIASTOLIC BLOOD PRESSURE: 90 MMHG

## 2019-01-01 DIAGNOSIS — I50.22 CHRONIC SYSTOLIC HEART FAILURE (HCC): ICD-10-CM

## 2019-01-01 DIAGNOSIS — I25.10 ATHEROSCLEROSIS OF NATIVE CORONARY ARTERY OF NATIVE HEART WITHOUT ANGINA PECTORIS: ICD-10-CM

## 2019-01-01 DIAGNOSIS — I50.1 PULMONARY EDEMA WITH CONGESTIVE HEART FAILURE (HCC): ICD-10-CM

## 2019-01-01 DIAGNOSIS — N18.30 CHRONIC KIDNEY DISEASE, STAGE III (MODERATE) (HCC): ICD-10-CM

## 2019-01-01 DIAGNOSIS — J90 PLEURAL EFFUSION: ICD-10-CM

## 2019-01-01 DIAGNOSIS — R57.0 CARDIOGENIC SHOCK (HCC): ICD-10-CM

## 2019-01-01 DIAGNOSIS — I48.19 PERSISTENT ATRIAL FIBRILLATION (HCC): ICD-10-CM

## 2019-01-01 DIAGNOSIS — I46.9 CARDIAC ARREST (HCC): ICD-10-CM

## 2019-01-01 DIAGNOSIS — Z79.01 ANTICOAGULANT LONG-TERM USE: ICD-10-CM

## 2019-01-01 DIAGNOSIS — I47.29 NSVT (NONSUSTAINED VENTRICULAR TACHYCARDIA): Primary | ICD-10-CM

## 2019-01-01 DIAGNOSIS — I50.9 ACUTE ON CHRONIC CONGESTIVE HEART FAILURE, UNSPECIFIED HEART FAILURE TYPE (HCC): ICD-10-CM

## 2019-01-01 DIAGNOSIS — I50.23 ACUTE ON CHRONIC SYSTOLIC HF (HEART FAILURE) (HCC): ICD-10-CM

## 2019-01-01 DIAGNOSIS — R60.9 EDEMA, UNSPECIFIED TYPE: ICD-10-CM

## 2019-01-01 DIAGNOSIS — Z79.899 LONG-TERM USE OF HIGH-RISK MEDICATION: ICD-10-CM

## 2019-01-01 DIAGNOSIS — R06.00 DYSPNEA, UNSPECIFIED TYPE: ICD-10-CM

## 2019-01-01 DIAGNOSIS — I48.91 ATRIAL FIBRILLATION WITH RVR (HCC): ICD-10-CM

## 2019-01-01 LAB
ALBUMIN SERPL-MCNC: 3.3 G/DL (ref 3.2–4.6)
ALBUMIN/GLOB SERPL: 0.9 {RATIO} (ref 1.2–3.5)
ALP SERPL-CCNC: 69 U/L (ref 50–136)
ALT SERPL-CCNC: 92 U/L (ref 12–65)
ANION GAP SERPL CALC-SCNC: 11 MMOL/L
ANION GAP SERPL CALC-SCNC: 20 MMOL/L (ref 7–16)
ANION GAP SERPL CALC-SCNC: 7 MMOL/L
ANION GAP SERPL CALC-SCNC: 9 MMOL/L
APPEARANCE FLD: CLEAR
ARTERIAL PATENCY WRIST A: YES
ARTERIAL PATENCY WRIST A: YES
AST SERPL-CCNC: 68 U/L (ref 15–37)
ATRIAL RATE: 120 BPM
BACTERIA SPEC CULT: NORMAL
BASE DEFICIT BLD-SCNC: 11 MMOL/L
BASE DEFICIT BLD-SCNC: 11 MMOL/L
BASOPHILS # BLD: 0 K/UL (ref 0–0.2)
BASOPHILS # BLD: 0.1 K/UL (ref 0–0.2)
BASOPHILS NFR BLD: 0 % (ref 0–2)
BASOPHILS NFR BLD: 1 % (ref 0–2)
BDY SITE: ABNORMAL
BDY SITE: ABNORMAL
BILIRUB SERPL-MCNC: 4.6 MG/DL (ref 0.2–1.1)
BNP SERPL-MCNC: 4202 PG/ML
BODY TEMPERATURE: 98.6
BODY TEMPERATURE: 98.6
BUN SERPL-MCNC: 43 MG/DL (ref 8–23)
BUN SERPL-MCNC: 68 MG/DL (ref 8–23)
BUN SERPL-MCNC: 81 MG/DL (ref 8–23)
BUN SERPL-MCNC: 84 MG/DL (ref 8–23)
CALCIUM SERPL-MCNC: 8.6 MG/DL (ref 8.3–10.4)
CALCIUM SERPL-MCNC: 8.6 MG/DL (ref 8.3–10.4)
CALCIUM SERPL-MCNC: 8.8 MG/DL (ref 8.3–10.4)
CALCIUM SERPL-MCNC: 9.3 MG/DL (ref 8.3–10.4)
CALCULATED R AXIS, ECG10: -99 DEGREES
CALCULATED T AXIS, ECG11: 71 DEGREES
CHLORIDE SERPL-SCNC: 104 MMOL/L (ref 98–107)
CHLORIDE SERPL-SCNC: 105 MMOL/L (ref 98–107)
CHLORIDE SERPL-SCNC: 94 MMOL/L (ref 98–107)
CHLORIDE SERPL-SCNC: 94 MMOL/L (ref 98–107)
CO2 BLD-SCNC: 13 MMOL/L
CO2 BLD-SCNC: 14 MMOL/L
CO2 SERPL-SCNC: 20 MMOL/L (ref 21–32)
CO2 SERPL-SCNC: 27 MMOL/L (ref 21–32)
CO2 SERPL-SCNC: 27 MMOL/L (ref 21–32)
CO2 SERPL-SCNC: 30 MMOL/L (ref 21–32)
COLLECT TIME,HTIME: 1012
COLLECT TIME,HTIME: 1156
COLOR FLD: YELLOW
CREAT SERPL-MCNC: 2 MG/DL (ref 0.8–1.5)
CREAT SERPL-MCNC: 2.6 MG/DL (ref 0.8–1.5)
CREAT SERPL-MCNC: 2.8 MG/DL (ref 0.8–1.5)
CREAT SERPL-MCNC: 3.37 MG/DL (ref 0.8–1.5)
DIAGNOSIS, 93000: NORMAL
DIFFERENTIAL METHOD BLD: ABNORMAL
DIFFERENTIAL METHOD BLD: ABNORMAL
EOSINOPHIL # BLD: 0.2 K/UL (ref 0–0.8)
EOSINOPHIL # BLD: 0.4 K/UL (ref 0–0.8)
EOSINOPHIL NFR BLD: 2 % (ref 0.5–7.8)
EOSINOPHIL NFR BLD: 6 % (ref 0.5–7.8)
ERYTHROCYTE [DISTWIDTH] IN BLOOD BY AUTOMATED COUNT: 14.6 % (ref 11.9–14.6)
ERYTHROCYTE [DISTWIDTH] IN BLOOD BY AUTOMATED COUNT: 15.8 % (ref 11.9–14.6)
FLOW RATE ISTAT,IFRATE: 15 L/MIN
FLOW RATE ISTAT,IFRATE: 5 L/MIN
GAS FLOW.O2 O2 DELIVERY SYS: ABNORMAL L/MIN
GAS FLOW.O2 O2 DELIVERY SYS: ABNORMAL L/MIN
GLOBULIN SER CALC-MCNC: 3.6 G/DL (ref 2.3–3.5)
GLUCOSE BLD STRIP.AUTO-MCNC: 89 MG/DL (ref 65–100)
GLUCOSE FLD-MCNC: NORMAL MG/DL
GLUCOSE SERPL-MCNC: 106 MG/DL (ref 65–100)
GLUCOSE SERPL-MCNC: 110 MG/DL (ref 65–100)
GLUCOSE SERPL-MCNC: 127 MG/DL (ref 65–100)
GLUCOSE SERPL-MCNC: 147 MG/DL (ref 65–100)
GRAM STN SPEC: NORMAL
GRAM STN SPEC: NORMAL
HCO3 BLD-SCNC: 12.7 MMOL/L (ref 22–26)
HCO3 BLD-SCNC: 12.9 MMOL/L (ref 22–26)
HCT VFR BLD AUTO: 40.4 % (ref 41.1–50.3)
HCT VFR BLD AUTO: 53.8 % (ref 41.1–50.3)
HGB BLD-MCNC: 13.7 G/DL (ref 13.6–17.2)
HGB BLD-MCNC: 17 G/DL (ref 13.6–17.2)
IMM GRANULOCYTES # BLD AUTO: 0 K/UL (ref 0–0.5)
IMM GRANULOCYTES # BLD AUTO: 0.1 K/UL (ref 0–0.5)
IMM GRANULOCYTES NFR BLD AUTO: 0 % (ref 0–5)
IMM GRANULOCYTES NFR BLD AUTO: 1 % (ref 0–5)
INR PPP: 4.6
LACTATE BLD-SCNC: 9.73 MMOL/L (ref 0.5–1.9)
LDH FLD L TO P-CCNC: NORMAL U/L
LYMPHOCYTES # BLD: 0.6 K/UL (ref 0.5–4.6)
LYMPHOCYTES # BLD: 1.4 K/UL (ref 0.5–4.6)
LYMPHOCYTES NFR BLD: 10 % (ref 13–44)
LYMPHOCYTES NFR BLD: 17 % (ref 13–44)
LYMPHOCYTES NFR FLD: 69 %
MAGNESIUM SERPL-MCNC: 3.2 MG/DL (ref 1.8–2.4)
MCH RBC QN AUTO: 30.8 PG (ref 26.1–32.9)
MCH RBC QN AUTO: 31 PG (ref 26.1–32.9)
MCHC RBC AUTO-ENTMCNC: 31.6 G/DL (ref 31.4–35)
MCHC RBC AUTO-ENTMCNC: 33.9 G/DL (ref 31.4–35)
MCV RBC AUTO: 91.4 FL (ref 79.6–97.8)
MCV RBC AUTO: 97.5 FL (ref 79.6–97.8)
MONOCYTES # BLD: 0.7 K/UL (ref 0.1–1.3)
MONOCYTES # BLD: 0.8 K/UL (ref 0.1–1.3)
MONOCYTES NFR BLD: 10 % (ref 4–12)
MONOCYTES NFR BLD: 12 % (ref 4–12)
NEUTROPHILS NFR FLD: 31 %
NEUTS SEG # BLD: 4.1 K/UL (ref 1.7–8.2)
NEUTS SEG # BLD: 5.7 K/UL (ref 1.7–8.2)
NEUTS SEG NFR BLD: 70 % (ref 43–78)
NEUTS SEG NFR BLD: 71 % (ref 43–78)
NRBC # BLD: 0 K/UL (ref 0–0.2)
NRBC # BLD: 0 K/UL (ref 0–0.2)
NUC CELL # FLD: 101 /CU MM
O2/TOTAL GAS SETTING VFR VENT: 100 %
PCO2 BLD: 24 MMHG (ref 35–45)
PCO2 BLD: 24.8 MMHG (ref 35–45)
PH BLD: 7.32 [PH] (ref 7.35–7.45)
PH BLD: 7.33 [PH] (ref 7.35–7.45)
PLATELET # BLD AUTO: 138 K/UL (ref 150–450)
PLATELET # BLD AUTO: 173 K/UL (ref 150–450)
PMV BLD AUTO: 10.8 FL (ref 9.4–12.3)
PMV BLD AUTO: 11.3 FL (ref 9.4–12.3)
PO2 BLD: 187 MMHG (ref 75–100)
PO2 BLD: 97 MMHG (ref 75–100)
POTASSIUM SERPL-SCNC: 3.5 MMOL/L (ref 3.5–5.1)
POTASSIUM SERPL-SCNC: 4.2 MMOL/L (ref 3.5–5.1)
POTASSIUM SERPL-SCNC: 4.2 MMOL/L (ref 3.5–5.1)
POTASSIUM SERPL-SCNC: 4.3 MMOL/L (ref 3.5–5.1)
PROCALCITONIN SERPL-MCNC: 0.3 NG/ML
PROT FLD-MCNC: NORMAL G/DL
PROT SERPL-MCNC: 6.9 G/DL (ref 6.3–8.2)
PROTHROMBIN TIME: 42.8 SEC (ref 11.7–14.5)
Q-T INTERVAL, ECG07: 328 MS
QRS DURATION, ECG06: 148 MS
QTC CALCULATION (BEZET), ECG08: 506 MS
RBC # BLD AUTO: 4.42 M/UL (ref 4.23–5.6)
RBC # BLD AUTO: 5.52 M/UL (ref 4.23–5.6)
RBC # FLD: 1000 /CU MM
SAO2 % BLD: 100 % (ref 95–98)
SAO2 % BLD: 97 % (ref 95–98)
SERVICE CMNT-IMP: ABNORMAL
SERVICE CMNT-IMP: NORMAL
SODIUM SERPL-SCNC: 134 MMOL/L (ref 136–145)
SODIUM SERPL-SCNC: 135 MMOL/L (ref 136–145)
SODIUM SERPL-SCNC: 139 MMOL/L (ref 136–145)
SODIUM SERPL-SCNC: 140 MMOL/L (ref 136–145)
SPECIMEN SOURCE FLD: NORMAL
SPECIMEN TYPE: ABNORMAL
SPECIMEN TYPE: ABNORMAL
TROPONIN I BLD-MCNC: 0.08 NG/ML (ref 0.02–0.05)
VENTRICULAR RATE, ECG03: 143 BPM
WBC # BLD AUTO: 5.8 K/UL (ref 4.3–11.1)
WBC # BLD AUTO: 8.2 K/UL (ref 4.3–11.1)

## 2019-01-01 PROCEDURE — 74011000250 HC RX REV CODE- 250: Performed by: NURSE PRACTITIONER

## 2019-01-01 PROCEDURE — 82803 BLOOD GASES ANY COMBINATION: CPT

## 2019-01-01 PROCEDURE — 77030005402 HC CATH RAD ART LN KT TELE -B

## 2019-01-01 PROCEDURE — 71045 X-RAY EXAM CHEST 1 VIEW: CPT

## 2019-01-01 PROCEDURE — 99223 1ST HOSP IP/OBS HIGH 75: CPT | Performed by: INTERNAL MEDICINE

## 2019-01-01 PROCEDURE — 74011000258 HC RX REV CODE- 258: Performed by: EMERGENCY MEDICINE

## 2019-01-01 PROCEDURE — 77030014147 HC TY THORCENT PARA TELE -B: Performed by: INTERNAL MEDICINE

## 2019-01-01 PROCEDURE — 85610 PROTHROMBIN TIME: CPT

## 2019-01-01 PROCEDURE — C1751 CATH, INF, PER/CENT/MIDLINE: HCPCS

## 2019-01-01 PROCEDURE — 32555 ASPIRATE PLEURA W/ IMAGING: CPT | Performed by: INTERNAL MEDICINE

## 2019-01-01 PROCEDURE — 74011000258 HC RX REV CODE- 258: Performed by: INTERNAL MEDICINE

## 2019-01-01 PROCEDURE — 04HY32Z INSERTION OF MONITORING DEVICE INTO LOWER ARTERY, PERCUTANEOUS APPROACH: ICD-10-PCS | Performed by: INTERNAL MEDICINE

## 2019-01-01 PROCEDURE — 76040000007: Performed by: INTERNAL MEDICINE

## 2019-01-01 PROCEDURE — 80053 COMPREHEN METABOLIC PANEL: CPT

## 2019-01-01 PROCEDURE — 36600 WITHDRAWAL OF ARTERIAL BLOOD: CPT

## 2019-01-01 PROCEDURE — 36415 COLL VENOUS BLD VENIPUNCTURE: CPT

## 2019-01-01 PROCEDURE — 74011250636 HC RX REV CODE- 250/636: Performed by: INTERNAL MEDICINE

## 2019-01-01 PROCEDURE — 80048 BASIC METABOLIC PNL TOTAL CA: CPT

## 2019-01-01 PROCEDURE — 65620000000 HC RM CCU GENERAL

## 2019-01-01 PROCEDURE — 74011250636 HC RX REV CODE- 250/636: Performed by: EMERGENCY MEDICINE

## 2019-01-01 PROCEDURE — 36620 INSERTION CATHETER ARTERY: CPT | Performed by: INTERNAL MEDICINE

## 2019-01-01 PROCEDURE — 83880 ASSAY OF NATRIURETIC PEPTIDE: CPT

## 2019-01-01 PROCEDURE — 87040 BLOOD CULTURE FOR BACTERIA: CPT

## 2019-01-01 PROCEDURE — 87116 MYCOBACTERIA CULTURE: CPT

## 2019-01-01 PROCEDURE — 05HY33Z INSERTION OF INFUSION DEVICE INTO UPPER VEIN, PERCUTANEOUS APPROACH: ICD-10-PCS | Performed by: INTERNAL MEDICINE

## 2019-01-01 PROCEDURE — 84484 ASSAY OF TROPONIN QUANT: CPT

## 2019-01-01 PROCEDURE — 83615 LACTATE (LD) (LDH) ENZYME: CPT

## 2019-01-01 PROCEDURE — 77030020230

## 2019-01-01 PROCEDURE — 88305 TISSUE EXAM BY PATHOLOGIST: CPT

## 2019-01-01 PROCEDURE — 84145 PROCALCITONIN (PCT): CPT

## 2019-01-01 PROCEDURE — 83735 ASSAY OF MAGNESIUM: CPT

## 2019-01-01 PROCEDURE — 76705 ECHO EXAM OF ABDOMEN: CPT

## 2019-01-01 PROCEDURE — 89050 BODY FLUID CELL COUNT: CPT

## 2019-01-01 PROCEDURE — 77030034850

## 2019-01-01 PROCEDURE — 96375 TX/PRO/DX INJ NEW DRUG ADDON: CPT | Performed by: EMERGENCY MEDICINE

## 2019-01-01 PROCEDURE — 99285 EMERGENCY DEPT VISIT HI MDM: CPT | Performed by: EMERGENCY MEDICINE

## 2019-01-01 PROCEDURE — 87205 SMEAR GRAM STAIN: CPT

## 2019-01-01 PROCEDURE — 93005 ELECTROCARDIOGRAM TRACING: CPT | Performed by: INTERNAL MEDICINE

## 2019-01-01 PROCEDURE — 96365 THER/PROPH/DIAG IV INF INIT: CPT | Performed by: EMERGENCY MEDICINE

## 2019-01-01 PROCEDURE — 36556 INSERT NON-TUNNEL CV CATH: CPT | Performed by: INTERNAL MEDICINE

## 2019-01-01 PROCEDURE — 85025 COMPLETE CBC W/AUTO DIFF WBC: CPT

## 2019-01-01 PROCEDURE — 84157 ASSAY OF PROTEIN OTHER: CPT

## 2019-01-01 PROCEDURE — 87102 FUNGUS ISOLATION CULTURE: CPT

## 2019-01-01 PROCEDURE — 82962 GLUCOSE BLOOD TEST: CPT

## 2019-01-01 PROCEDURE — 74011000250 HC RX REV CODE- 250: Performed by: INTERNAL MEDICINE

## 2019-01-01 PROCEDURE — 5A12012 PERFORMANCE OF CARDIAC OUTPUT, SINGLE, MANUAL: ICD-10-PCS | Performed by: INTERNAL MEDICINE

## 2019-01-01 PROCEDURE — 77010033678 HC OXYGEN DAILY

## 2019-01-01 PROCEDURE — 82945 GLUCOSE OTHER FLUID: CPT

## 2019-01-01 PROCEDURE — 83605 ASSAY OF LACTIC ACID: CPT

## 2019-01-01 PROCEDURE — 88112 CYTOPATH CELL ENHANCE TECH: CPT

## 2019-01-01 PROCEDURE — 74011250636 HC RX REV CODE- 250/636

## 2019-01-01 PROCEDURE — 77030019605

## 2019-01-01 PROCEDURE — 92950 HEART/LUNG RESUSCITATION CPR: CPT | Performed by: INTERNAL MEDICINE

## 2019-01-01 RX ORDER — NITROGLYCERIN 0.4 MG/1
0.4 TABLET SUBLINGUAL
Status: DISCONTINUED | OUTPATIENT
Start: 2019-01-01 | End: 2019-01-01 | Stop reason: HOSPADM

## 2019-01-01 RX ORDER — DOBUTAMINE HYDROCHLORIDE 200 MG/100ML
0-10 INJECTION INTRAVENOUS
Status: DISCONTINUED | OUTPATIENT
Start: 2019-01-01 | End: 2019-01-01 | Stop reason: HOSPADM

## 2019-01-01 RX ORDER — EPINEPHRINE 0.1 MG/ML
INJECTION INTRACARDIAC; INTRAVENOUS
Status: COMPLETED | OUTPATIENT
Start: 2019-01-01 | End: 2019-01-01

## 2019-01-01 RX ORDER — ACETAMINOPHEN 325 MG/1
650 TABLET ORAL
Status: DISCONTINUED | OUTPATIENT
Start: 2019-01-01 | End: 2019-01-01 | Stop reason: HOSPADM

## 2019-01-01 RX ORDER — FENTANYL CITRATE 50 UG/ML
INJECTION, SOLUTION INTRAMUSCULAR; INTRAVENOUS
Status: COMPLETED
Start: 2019-01-01 | End: 2019-01-01

## 2019-01-01 RX ORDER — SODIUM BICARBONATE 84 MG/ML
50 INJECTION, SOLUTION INTRAVENOUS ONCE
Status: COMPLETED | OUTPATIENT
Start: 2019-01-01 | End: 2019-01-01

## 2019-01-01 RX ORDER — HYDROCODONE BITARTRATE AND ACETAMINOPHEN 5; 325 MG/1; MG/1
1 TABLET ORAL
Status: DISCONTINUED | OUTPATIENT
Start: 2019-01-01 | End: 2019-01-01 | Stop reason: HOSPADM

## 2019-01-01 RX ORDER — DOPAMINE HYDROCHLORIDE 320 MG/100ML
10 INJECTION, SOLUTION INTRAVENOUS
Status: DISCONTINUED | OUTPATIENT
Start: 2019-01-01 | End: 2019-01-01

## 2019-01-01 RX ORDER — MORPHINE SULFATE 2 MG/ML
2 INJECTION, SOLUTION INTRAMUSCULAR; INTRAVENOUS
Status: DISCONTINUED | OUTPATIENT
Start: 2019-01-01 | End: 2019-01-01 | Stop reason: HOSPADM

## 2019-01-01 RX ORDER — DOPAMINE HYDROCHLORIDE 320 MG/100ML
0-50 INJECTION, SOLUTION INTRAVENOUS
Status: DISCONTINUED | OUTPATIENT
Start: 2019-01-01 | End: 2019-01-01 | Stop reason: HOSPADM

## 2019-01-01 RX ORDER — DOBUTAMINE HYDROCHLORIDE 200 MG/100ML
0-10 INJECTION INTRAVENOUS
Status: DISCONTINUED | OUTPATIENT
Start: 2019-01-01 | End: 2019-01-01

## 2019-01-01 RX ORDER — MAGNESIUM SULFATE HEPTAHYDRATE 40 MG/ML
2 INJECTION, SOLUTION INTRAVENOUS
Status: COMPLETED | OUTPATIENT
Start: 2019-01-01 | End: 2019-01-01

## 2019-01-01 RX ORDER — FENTANYL CITRATE 50 UG/ML
50 INJECTION, SOLUTION INTRAMUSCULAR; INTRAVENOUS
Status: COMPLETED | OUTPATIENT
Start: 2019-01-01 | End: 2019-01-01

## 2019-01-01 RX ORDER — NOREPINEPHRINE BIT/0.9 % NACL 4MG/250ML
PLASTIC BAG, INJECTION (ML) INTRAVENOUS
Status: DISCONTINUED
Start: 2019-01-01 | End: 2019-01-01 | Stop reason: HOSPADM

## 2019-01-01 RX ORDER — SODIUM CHLORIDE 0.9 % (FLUSH) 0.9 %
5-40 SYRINGE (ML) INJECTION AS NEEDED
Status: DISCONTINUED | OUTPATIENT
Start: 2019-01-01 | End: 2019-01-01 | Stop reason: HOSPADM

## 2019-01-01 RX ADMIN — DOPAMINE HYDROCHLORIDE 5 MCG/KG/MIN: 320 INJECTION, SOLUTION INTRAVENOUS at 15:41

## 2019-01-01 RX ADMIN — MAGNESIUM SULFATE HEPTAHYDRATE 2 G: 40 INJECTION, SOLUTION INTRAVENOUS at 10:13

## 2019-01-01 RX ADMIN — NOREPINEPHRINE BITARTRATE 16 MCG/MIN: 1 INJECTION, SOLUTION, CONCENTRATE INTRAVENOUS at 16:24

## 2019-01-01 RX ADMIN — AMIODARONE HYDROCHLORIDE 150 MG: 50 INJECTION, SOLUTION INTRAVENOUS at 10:12

## 2019-01-01 RX ADMIN — EPINEPHRINE 1 MG: 0.1 INJECTION, SOLUTION ENDOTRACHEAL; INTRACARDIAC; INTRAVENOUS at 16:18

## 2019-01-01 RX ADMIN — EPINEPHRINE 1 MG: 0.1 INJECTION, SOLUTION ENDOTRACHEAL; INTRACARDIAC; INTRAVENOUS at 16:28

## 2019-01-01 RX ADMIN — EPINEPHRINE 1 MG: 0.1 INJECTION, SOLUTION ENDOTRACHEAL; INTRACARDIAC; INTRAVENOUS at 16:25

## 2019-01-01 RX ADMIN — SODIUM BICARBONATE 50 MEQ: 84 INJECTION, SOLUTION INTRAVENOUS at 13:00

## 2019-01-01 RX ADMIN — AMIODARONE HYDROCHLORIDE 1 MG/MIN: 50 INJECTION, SOLUTION INTRAVENOUS at 10:48

## 2019-01-01 RX ADMIN — EPINEPHRINE 1 MG: 0.1 INJECTION, SOLUTION ENDOTRACHEAL; INTRACARDIAC; INTRAVENOUS at 16:14

## 2019-01-01 RX ADMIN — FENTANYL CITRATE 50 MCG: 50 INJECTION, SOLUTION INTRAMUSCULAR; INTRAVENOUS at 16:32

## 2019-01-01 RX ADMIN — FENTANYL CITRATE 50 MCG: 50 INJECTION INTRAMUSCULAR; INTRAVENOUS at 16:32

## 2019-01-10 NOTE — PROGRESS NOTES
Advised pt that Dr. Carlo Dela Cruz wants him to know that his labs were stable, no change in meds. Pt verbalized understanding. See triage note.

## 2019-04-01 PROBLEM — I95.9 HYPOTENSION: Status: ACTIVE | Noted: 2019-01-01

## 2019-04-03 PROBLEM — J90 PLEURAL EFFUSION: Status: ACTIVE | Noted: 2019-01-01

## 2019-04-03 NOTE — H&P (VIEW-ONLY)
[address] [facility] [address]. [address] [facility], [address] [facility] 
[phone] Patient Name:  Ekta Hernandez YOB: 1950 Office Visit 4/3/2019 CHIEF COMPLAINT:   
Chief Complaint Patient presents with  New Patient  
  pleural effusion HISTORY OF PRESENT ILLNESS:   
I had the pleasure of seeing Mr. Kiel Shepard in our clinic today. Mr. Cyrus Musa is a 77 y.o. male who presents with a history of ischemic cardiomyopathy, persistent atrial fibrillation on Eliquis, Parkinson's and obstructive sleep apnea who presents to the pulmonary clinic in evaluation for a new lead developed right pleural effusion. He has been following with cardiology increasingly for the past month due to worsening shortness of breath and leg swelling and has attempted increased Lasix dose, metolazone which was limited by hypotension and increased renal failure. He has stage III CKD and follows with a nephrologist.  He took his Eliquis this morning. He reports that he has had more shortness of breath over the last 3-4 weeks and worse with laying flat. He continues to use his CPAP at night with good results. He denies fevers, chills, night sweats, sputum or hemoptysis. Most recently this week he was seen by cardiology and  told to stop his diuresis and Coreg secondary to low blood pressures and was considered for admission at that time. He presents today feeling otherwise stable without any acute concerns. He reports no worsening shortness of breath over the last few days. His blood pressure he notes is better and has not been monitoring his heart rate at home. He actually reports about a 3 pound weight loss over the last few days per measurements Past Medical History:  
Diagnosis Date  Abnormal stress echo-suggested anterior ischemia 5/12/2011  Arthritis   
 knees  CAD (coronary artery disease) 2 [address] [place] [address]  Chronic kidney disease  Chronic systolic heart failure ([facility]  CKD (chronic kidney disease)  Dyskinesia 2/17/2016  GERD (gastroesophageal reflux disease)  Hepatitis History of  
 HTN (hypertension)  Hyperlipidemia  ICD (implantable cardiac defibrillator) in place 2012 [place] dual-chamber ICD impalnted 8/28/12  Nonischemic cardiomyopathy ([facility] 8/29/2012  Organic hypersomnia, unspecified  Orthostatic hypotension 2/17/2016  MARICHUY (obstructive sleep apnea)   
 cpap  Parkinson disease ([facility] Past Surgical History:  
Procedure Laterality Date  CARDIAC SURG PROCEDURE UNLIST  COLONOSCOPY  2004, 2014  
 with polypectomy  HX IMPLANTABLE CARDIOVERTER DEFIBRILLATOR  2012  HX PTCA  2011 [place] [address] [facility] 5-2011  HX TONSILLECTOMY    
 as child  HX UROLOGICAL  2012  
 prostate biopsy negative No flowsheet data found. Social History Socioeconomic History  Marital status:  Spouse name: Not on file  Number of children: Not on file  Years of education: Not on file  Highest education level: Not on file Occupational History  Not on file Social Needs  Financial resource strain: Not on file  Food insecurity:  
  Worry: Not on file Inability: Not on file  Transportation needs:  
  Medical: Not on file Non-medical: Not on file Tobacco Use  Smoking status: Never Smoker  Smokeless tobacco: Never Used Substance and Sexual Activity  Alcohol use: Yes Alcohol/week: 1.0 oz Types: 2 Cans of beer per week  Drug use: No  
 Sexual activity: Yes  
  Partners: Female Lifestyle  Physical activity:  
  Days per week: Not on file Minutes per session: Not on file  Stress: Not on file Relationships  Social connections:  
  Talks on phone: Not on file Gets together: Not on file Attends Advent service: Not on file Active member of club or organization: Not on file Attends meetings of clubs or organizations: Not on file Relationship status: Not on file  Intimate partner violence:  
  Fear of current or ex partner: Not on file Emotionally abused: Not on file Physically abused: Not on file Forced sexual activity: Not on file Other Topics Concern  Not on file Social History Narrative  Not on file Exposure History: 
Second Hand Smoke Exposure: YES Birds: NO 
Asbestos: NO 
TB: NO Hot Tubs/Humidifier: NO Organic/Inorganic Dusts: NO 
Molds: NO Occupation/Hobbies: Retired . Lives with wife, indoor cat. Never smoker Family History Problem Relation Age of Onset  Heart Disease Mother  Heart Attack Mother 80 MI  
 Heart Failure Mother 80  
 Other Mother   
     renal failure  Elevated Lipids Mother  Hypertension Mother  Diabetes Mother  Heart Disease Father  Heart Attack Father 68 MI  
 Stroke Father  Hypertension Father  Elevated Lipids Father  Hypertension Brother  Heart Disease Brother CAD/MI  
 Psychiatric Disorder Sister Bipolar Allergies Allergen Reactions  Statins-Hmg-Coa Reductase Inhibitors Myalgia Current Outpatient Medications Medication Sig  
 rasagiline (AZILECT) 1 mg tablet Take 1 Tab by mouth daily.  carbidopa-levodopa (SINEMET)  mg per tablet 1.5 tabs 8am, 1pm, 6pm and 10pm  
 pitavastatin calcium (LIVALO) 2 mg tablet Take 1 Tab by mouth daily.  apixaban (ELIQUIS) 5 mg tablet Take 1 Tab by mouth two (2) times a day.  nitroglycerin (NITROSTAT) 0.4 mg SL tablet 1 Tab by SubLINGual route every five (5) minutes as needed.  cyanocobalamin 1,000 mcg tablet Take 1,000 mcg by mouth daily.  cpap machine kit by Does Not Apply route. 10cm  aspirin delayed-release 81 mg tablet Take  by mouth daily.  cholecalciferol, vitamin D3, (VITAMIN D3) 2,000 unit Tab Take  by mouth daily. No current facility-administered medications for this visit. REVIEW OF SYSTEMS: 04/03/2019 General: Weight gain Negative for unexplained weight loss / Weight loss /  / Fever / Chills / Fatigue / Night sweats Eyes: 
Negative for Vision loss / Blurred vision / Double vision / Pain / Redness / Dryness Ears: 
Negative for Hearing loss / [place] / Pain Nose: 
Negative for Postnasal drainage / Bleeding / Nasal congestion Mouth/Throat: 
Negative for Sore throat / Uclers / Bleeding gums / Hoarseness / Snoring Neck: 
Negative for Neck stiffness / Pain / [place] / Mass Respiratory:Shortness of breath / CoughNegative for  / Wheezing / Coughing up blood / Sleep apnea Cardiovascular:Sleep on multiple pillows / Sudden shortness of breath during sleep/ Swelling in legs or feetNegative for Chest pain / Palpitations /  / History of heart murmur / Passing out Gastrointestinal: 
Negative for Indigestion / Reflux / Nausea / Vomiting / Diarrhea / Constipation / Difficulty swallowing / Choking with eating or drining / Blood in stools / Loss of appetite / Abdominal pain Genitourinary: 
Negative for Pain with urination / Frequent urination / Blood in urine / Frequent urination at night Musculoskeletal: 
Negative for Pain in joints / Pain in muscles / Weakness in arms or legs / Back pain Neurological: 
Negative for Seizures / Severe headaches / Dizziness / Numbness / Tremors / Memory loss / Morning headaches Psychiatric: 
Negative for Anxiety / Depression / Suicidal thoughts or plans / Hallucinations Allergies: 
Negative for Seasonal allergies / Watery itchy eyes / Nasal congestion or drainage Skin: 
Negative for Rash / Lesions / Itching / Hair loss / Dry skin Hematology/ Lymph: 
Negative for Bruise easily / Anemia / Swollen or tender glands / Blood clots PHYSICAL EXAM: 
Visit Vitals /78 (BP 1 Location: Left arm, BP Patient Position: Sitting) Pulse (!) 123 Resp 16  
 Ht 5' 8\" (1.727 m) Wt 178 lb (80.7 kg) SpO2 91% BMI 27.06 kg/m² GENERAL APPEARANCE: 
The patient is normal weight and in no respiratory distress.  on exam.  
HEENT: 
PERRL. Conjunctivae unremarkable. Nasal mucosa is without epistaxis, exudate, or polyps. Gums and dentition are unremarkable. There is no oropharyngeal narrowing. NECK/LYMPHATIC: 
Symmetrical with no elevation of jugular venous pulsation. Trachea midline. No thyroid enlargement. No cervical adenopathy. LUNGS: 
Normal respiratory effort with symmetrical lung expansion. Breath sounds diminished on right 50% or more, no rales heard on left. 97% during my exam 
HEART: 
There is a regular rate and rhythm. No murmur, rub, or gallop. There is 2+ edema in the lower extremities. ABDOMEN: 
Soft and non-tender. No hepatosplenomegaly. Bowel sounds are normal.   
NEURO: 
The patient is alert and oriented to person, place, and time. Memory appears intact and mood is normal.  No gross sensorimotor deficits are present. +pill rolling tremor DIAGNOSTIC TESTS: 
CXR: 04/01/2019: moderate right pleural effusion Results for orders placed during the hospital encounter of 04/01/19 XR CHEST PA LAT Impression IMPRESSION: New, moderate-sized right pleural effusion. Spirometry: 04/03/2019: moderate restrictive pattern ASSESSMENT:  (Medical Decision Making) Diagnoses and all orders for this visit: 1. Pleural effusion Assessment & Plan: 
Presumed secondary to heart failure, however deserves diagnostic evaluation. No urgent risk factors such as fevers or hemoptysis. Will hold Eliquis starting now until planned thoracentesis on Friday. Will have him follow-up in 2 months with a repeat chest x-ray after this initial thoracentesis. Orders: -     AMB POC SPIROMETRY 2. Anticoagulant long-term use Assessment & Plan: 
Increases risk for thoracentesis procedure   And will need to be held at least 48 hours. 3. Chronic kidney disease, stage III (moderate) ([facility]) Assessment & Plan: 
Complicating his diuresis  potentially with cardiorenal syndrome. 4. Chronic systolic heart failure ([facility] Assessment & Plan: 
Presumable cause of worsening lower extremity edema and right pleural effusion, however will further evaluate with labs during thoracentesis. Key CAD CHF Meds   
    
  
 pitavastatin calcium (LIVALO) 2 mg tablet (Taking) Take 1 Tab by mouth daily. apixaban (ELIQUIS) 5 mg tablet (Taking) Take 1 Tab by mouth two (2) times a day. nitroglycerin (NITROSTAT) 0.4 mg SL tablet (Taking) 1 Tab by SubLINGual route every five (5) minutes as needed. aspirin delayed-release 81 mg tablet (Taking) Take  by mouth daily. Lab Results Component Value Date/Time BNP 2,552 12/21/2018 02:40 PM  
 Sodium 135 03/26/2019 02:25 PM  
 Potassium 3.5 03/26/2019 02:25 PM  
 Cholesterol, total 132 03/01/2018 08:42 AM  
 HDL Cholesterol 42 03/01/2018 08:42 AM  
 LDL, calculated 75 03/01/2018 08:42 AM  
 Triglyceride 75 03/01/2018 08:42 AM  
 INR 1.6 02/15/2018 07:19 AM  
 Prothrombin time 18.4 02/15/2018 07:19 AM  
 
 
 
5. Persistent atrial fibrillation ([facility] Assessment & Plan: Will need to hold Eliquis for procedure. Given increased heart rate since stopping Coreg and blood pressure is adequate today I recommended restarting Coreg at a lower dose at 3.125 mg twice daily. Key CAD CHF Meds   
    
  
 pitavastatin calcium (LIVALO) 2 mg tablet (Taking) Take 1 Tab by mouth daily. apixaban (ELIQUIS) 5 mg tablet (Taking) Take 1 Tab by mouth two (2) times a day. nitroglycerin (NITROSTAT) 0.4 mg SL tablet (Taking) 1 Tab by SubLINGual route every five (5) minutes as needed. aspirin delayed-release 81 mg tablet (Taking) Take  by mouth daily. Lab Results Component Value Date/Time  BNP 2,552 12/21/2018 02:40 PM  
 Sodium 135 03/26/2019 02:25 PM  
 Potassium 3.5 03/26/2019 02:25 PM  
 Cholesterol, total 132 03/01/2018 08:42 AM  
 HDL Cholesterol 42 03/01/2018 08:42 AM  
 LDL, calculated 75 03/01/2018 08:42 AM  
 Triglyceride 75 03/01/2018 08:42 AM  
 INR 1.6 02/15/2018 07:19 AM  
 Prothrombin time 18.4 02/15/2018 07:19 AM  
 
 
PLAN: 
See above Orders Placed This Encounter  AMB POC SPIROMETRY - 18008 Ceci Rosado MD 
Electronically signed

## 2019-04-05 NOTE — INTERVAL H&P NOTE
H&P Update: 
Naomi Cabrera was seen and examined. History and physical has been reviewed. The patient has been examined.  There have been no significant clinical changes since the completion of the originally dated History and Physical. 
 
Signed By: Kavita Gaspar MD   
 April 5, 2019 11:59 AM

## 2019-04-05 NOTE — PROCEDURES
PROCEDURE: 
 
DIAGNOSTIC/THERAPEUTIC THORACENTESIS. PRE-OP DIAGNOSIS: 
 
R PLEURAL EFFUSION POST-OP DIAGNOSIS: 
 
R PLEURAL EFFUSION 
 
ASSISTANT: 
 
none ANESTHESIA: 
 
LOCAL ANESTHESIA WITH 1% LIDOCAINE 10 CC TOTAL. CHEST ULTRASOUND FINDINGS: 
 
A Turbo-M, Sonosite ultrasound with a 5-16 mHz probe was used to image the chest and localize the pleural effusion on the Right chest. 
 
A large anechoic space was seen on the Right consistent with an uncomplicated pleural effusion. DESCRIPTION OF PROCEDURE: 
 
After obtaining informed consent and localizing the safest location for thoracentesis, the  10th intercostal space was marked with a blunt, plastic needle cap in the mid scapular line. An [employer] AK-0100 Pleral-Seal thoracentesis kit was used to perform the procedure. The skin was  cleansed with the supplied  chlorhexididne swab and then draped in the usual fasion. Using the previously marked location as a giude, a 22 G 1.5 inch needle was used to inject 10 cc of 1% lidocaine into the skin and subcutaneous tissue, as well as onto the underlying rib and inter-costal muscles, pleural fluid was aspirated to assure proper location, prior to removing the anesthesia needle. A 3mm  incision was then made, with the supplied scalpel in the usual fashion to facilitate the insertiopn of the thoracentesis needle. The needle with an 8French thoracentesis catheter was then introduced into the chest through the previously made incision in the usual fashion, the rib localized with the needle, and the catheter then marched over the rib into the pleural space. After aspirating fluid, the thoracentesis catheter was then placed into the chest using the needle itself as a trocar. The needle was then removed and the catheter was attached to the supplied tubing without complication. 12 cc of Clear yellow fluid, was aspirated and sent for analysis. Following this a total of 2270 cc was removed from the Right chest, without complication, with the patient experiencing coughing during the procedure. Fluid was sent for the following tests: 
 
Cell count with differential 
LDH Glucose Total protein Cytology ADA AFB Fungus Routine culture and Gram stain Post procedure US confirmed complete drainage of the effusion. EBL:  
 
1 drop COMPLICATIONS: 
 
None Kel Snell MD.

## 2019-04-05 NOTE — DISCHARGE INSTRUCTIONS
Patient Education        Thoracentesis: What to Expect at Home  Your Recovery  Thoracentesis (say \"ollw-dz-lbx-ANNIA-sis\") is a procedure to remove fluid from the space between the lungs and the chest wall (pleural space). This procedure may also be called a \"chest tap. \" It is normal to have a small amount of fluid in the pleural space. But too much fluid can build up because of problems such as infection, heart failure, or lung cancer. The procedure may have been done to help with shortness of breath and pain caused by the fluid buildup. Or you may have had this procedure so the doctor could test the fluid to find the cause of the buildup. Your chest may be sore where the doctor put the needle or catheter into your skin (the puncture site). This usually gets better after a day or two. You can go back to work or your normal activities as soon as you feel up to it. If the doctor sent the fluid to a lab for testing, it may take several days to get the results. The doctor or nurse will discuss the results with you. This care sheet gives you a general idea about how long it will take for you to recover. But each person recovers at a different pace. Follow the steps below to feel better as quickly as possible. How can you care for yourself at home? Activity    · Rest when you feel tired. Getting enough sleep will help you recover.     · Avoid strenuous activities, such as bicycle riding, jogging, weight lifting, or aerobic exercise, until your doctor says it is okay.     · You may shower. Do not take a bath until the puncture site has healed, or until your doctor tells you it is okay.     · Ask your doctor when you can drive again.     · You may need to take 1 or 2 days off from work. It depends on the type of work you do and how you feel. Diet    · You can eat your normal diet.     · Drink plenty of fluids (unless your doctor tells you not to).    Medicines    · Your doctor will tell you if and when you can restart your medicines. He or she will also give you instructions about taking any new medicines.     · If you take blood thinners, such as warfarin (Coumadin), clopidogrel (Plavix), or aspirin, be sure to talk to your doctor. He or she will tell you if and when to start taking those medicines again. Make sure that you understand exactly what your doctor wants you to do.     · Be safe with medicines. Take pain medicines exactly as directed. ? If the doctor gave you a prescription medicine for pain, take it as prescribed. ? If you are not taking a prescription pain medicine, ask your doctor if you can take an over-the-counter medicine. ? Do not take two or more pain medicines at the same time unless the doctor told you to. Many pain medicines have acetaminophen, which is Tylenol. Too much acetaminophen (Tylenol) can be harmful.     · If you think your pain medicine is making you sick to your stomach:  ? Take your medicine after meals (unless your doctor has told you not to). ? Ask your doctor for a different pain medicine.     · If your doctor prescribed antibiotics, take them as directed. Do not stop taking them just because you feel better. You need to take the full course of antibiotics.    Care of the puncture site    · Wash the area daily with warm, soapy water, and pat it dry. Don't use hydrogen peroxide or alcohol, which may delay healing. You may cover the area with a gauze bandage if it weeps or rubs against clothing. Change the bandage every day.     · Keep the area clean and dry. Follow-up care is a key part of your treatment and safety. Be sure to make and go to all appointments, and call your doctor if you are having problems. It's also a good idea to know your test results and keep a list of the medicines you take. Please tray and keep puncture site dry for next 24 hrs and keep all follow-up appointments.  If you become short of breathe before your next appointment please daniela  PPA @ [phone] for possible Thoracentesis. When should you call for help? Call 911 anytime you think you may need emergency care. For example, call if:    · You passed out (lost consciousness).     · You have severe trouble breathing.     · You have sudden chest pain and shortness of breath, or you cough up blood.    Call your doctor now or seek immediate medical care if:    · You have shortness of breath that is new or getting worse.     · You have new or worse pain in your chest, especially when you take a deep breath.     · You are sick to your stomach or cannot keep fluids down.     · You have a fever over 100°F.     · Bright red blood has soaked through the bandage over your puncture site.     · You have signs of infection, such as:  ? Increased pain, swelling, warmth, or redness. ? Red streaks leading from the puncture site. ? Pus draining from the puncture site. ? Swollen lymph nodes in your neck, armpits, or groin. ? A fever.     · You cough up a lot more mucus than normal, or your mucus changes color.    Watch closely for changes in your health, and be sure to contact your doctor if you have any problems. Where can you learn more? Go to http://norberto-sukh.info/. Enter E930 in the search box to learn more about \"Thoracentesis: What to Expect at Home. \"  Current as of: September 5, 2018  Content Version: 11.9  © 2778-6076 [employer]. Care instructions adapted under license by [employer] (which disclaims liability or warranty for this information). If you have questions about a medical condition or this instruction, always ask your healthcare professional. [place] [address] any warranty or liability for your use of this information.

## 2019-04-05 NOTE — PROGRESS NOTES
Pt sat up on side of bed for thoracentesis. Consent obtained. Time out performed. Pts vitals monitored throughout procedure. Right ultrasound done and pic taken of pleural fluid.  ~2270 ml yellow pleural fluid from right  Pt tolerated procedure well with no adverse rxn. Specimens sent to the lab x 3 and labeled appropriately. Site dressed appropriately. Lung sliding done and ultrasound findings reviewed by MD. Discharge instructions reviewed with patient. And pt discharged into wife's care via transport chair to car.

## 2019-04-14 PROBLEM — D68.9 COAGULOPATHY (HCC): Status: ACTIVE | Noted: 2019-01-01

## 2019-04-14 PROBLEM — J96.01 ACUTE HYPOXEMIC RESPIRATORY FAILURE (HCC): Status: ACTIVE | Noted: 2019-01-01

## 2019-04-14 PROBLEM — I47.29 NSVT (NONSUSTAINED VENTRICULAR TACHYCARDIA): Status: ACTIVE | Noted: 2019-01-01

## 2019-04-14 PROBLEM — I46.9 CARDIAC ARREST (HCC): Status: ACTIVE | Noted: 2019-01-01

## 2019-04-14 PROBLEM — I50.23 ACUTE ON CHRONIC SYSTOLIC HF (HEART FAILURE) (HCC): Status: ACTIVE | Noted: 2019-01-01

## 2019-04-14 PROBLEM — R57.0 CARDIOGENIC SHOCK (HCC): Status: ACTIVE | Noted: 2019-01-01

## 2019-04-14 NOTE — PROGRESS NOTES
1602-Cardiology notified that patients heart rate increased into 130s after dopamine started. Verbal order to stop dopamine and will switch to dobutamine.

## 2019-04-14 NOTE — PROGRESS NOTES
Patient called out to have a bowel movement. As starting to turn off dopamine, patient rhythm converted into VT in 180s. Patient began agonally breathing and not responding. Art line wave form went flat and no pulse palpable. CPR started and Code called. See code blue flowsheet.

## 2019-04-14 NOTE — PROCEDURES
Indication: Cardiogenic Shock Time out done and correct patient identified and correct incision/insertion site identified. Everyone Agrees For procedure sterile techniques used including: Sterile gown, gloves, cap, mask, drapes and chlorhexidine to the insertions site/location. Wrist Region Sterilized with Chlorhexedine. Patient given lidocaine 1% for local anesthesia. Using sterile Technique left arterial femoral line placed without complication. Note resultant arterial waveform. Patient tolerated procedure well. Line sutured in place. Estimated Blood loss: 5cc Tia Montes MD 
 
Electronically signed.

## 2019-04-14 NOTE — INTERVAL H&P NOTE
H&P Update: 
Casey Cervantes was seen and examined. History and physical has been reviewed. The patient has been examined.  There have been no significant clinical changes since the completion of the originally dated History and Physical.

## 2019-04-14 NOTE — PROGRESS NOTES
Reviewed notes of patient prior to visit Noted that patient was admitted to unit from ER Patient is didi Monterroso, staff Nathaly germain [address], [id number] [facility] Stevan  /   [email or web address]

## 2019-04-14 NOTE — PROGRESS NOTES
Patient brought to room and partial bedside report received. No drugs infusing. Patient hypotensive and jaundiced. A&Ox4. Dr Brooklyn Siegel to the bedside to assess patient. Planning for central line, art line, and flowtrac. Dr Brooklyn Siegel placed R IJ Quad lumen and L femoral art line. Flowtrac indicating CO of 4.5 and CI of 2.6

## 2019-04-14 NOTE — CONSULTS
CONSULT NOTE Modesto Houser 4/14/2019 Date of Admission:  4/14/2019 The patient's chart is reviewed and the patient is discussed with the staff. Subjective:  
 
Patient is a 77 y.o.  male seen and evaluated at the request of Dr. Lucien Lloyd. He is a 76 y/o male history of ischmic cardiomyopathy with ICD, persistent afib on Eliquis, Parkinson and MARICHUY whom I saw in the pulmonary clinic just 11 days ago for a new R pleural effusion. He underwent thoracentesis and had 2200 removed from the right thorax and it was transudative by labs and cyto and cultures were negative. He presented to the ED today via EMS on CPAP in AFib with RVR and having runs of NSVT. It was noted he was in renal failure and biliribin also had significantly increased and cardiology saw and evaluated and felt he was in acute on chronic heart failure. He is on [facility] in the ICU now when I was consulted for critical care management. He is sleepy and awakens to voice, but doesn't participate in interview. Review of Systems Review of systems not obtained due to patient factors. Patient Active Problem List  
Diagnosis Code  Coronary atherosclerosis of native coronary artery I25.10  Post PTCA-3.5 x 18mm Xience to mid LAD-5/11/11 Z98.61  
 MARICHUY (obstructive sleep apnea) G47.33  
 Abnormal stress echo-suggested anterior ischemia R94.39  
 Automatic implantable cardioverter-defibrillator in situ Z95.810  
 Nonischemic cardiomyopathy ([facility] [facility] I42.8  Chronic kidney disease, stage III (moderate) (HCC) N18.3  Chronic systolic heart failure (HCC) I50.22  
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  
 Hyperlipidemia E78.5  Medicare annual wellness visit, subsequent Z00.00  
 Orthostatic hypotension I95.1  Tremor R25.1  Neurologic orthostatic hypotension (HCC) G90.3  Encounter for long-term (current) use of high-risk medication Z79.899  Dyskinesia due to Parkinson's disease ([facility] [address] G24.9, G20  
 Parkinson disease ([facility] [address] G20  
 Persistent atrial fibrillation ([facility] [address] I48.1  Elevated PSA R97.20  Elevated TSH R79.89  
 Anticoagulant long-term use Z79.01  
 Hypotension I95.9  Pleural effusion J90  
 Acute on chronic systolic HF (heart failure) ([facility]) I50.23  
 NSVT (nonsustained ventricular tachycardia) ([facility]) I47.2 Prior to Admission Medications Prescriptions Last Dose Informant Patient Reported? Taking? LORazepam (ATIVAN) 0.5 mg tablet   No No  
Sig: Take 1 Tab by mouth every eight (8) hours as needed for Anxiety. Max Daily Amount: 1.5 mg.  
apixaban (ELIQUIS) 5 mg tablet   No No  
Sig: Take 1 Tab by mouth two (2) times a day. aspirin delayed-release 81 mg tablet   Yes No  
Sig: Take  by mouth daily. carbidopa-levodopa (SINEMET)  mg per tablet   No No  
Si.5 tabs 8am, 1pm, 6pm and 10pm  
cholecalciferol, vitamin D3, (VITAMIN D3) 2,000 unit Tab   Yes No  
Sig: Take  by mouth daily. cpap machine kit   Yes No  
Sig: by Does Not Apply route. 10cm  
cyanocobalamin 1,000 mcg tablet   Yes No  
Sig: Take 1,000 mcg by mouth daily. nitroglycerin (NITROSTAT) 0.4 mg SL tablet   No No  
Si Tab by SubLINGual route every five (5) minutes as needed. pitavastatin calcium (LIVALO) 2 mg tablet   No No  
Sig: Take 1 Tab by mouth daily. rasagiline (AZILECT) 1 mg tablet   No No  
Sig: Take 1 Tab by mouth daily. Facility-Administered Medications: None Past Medical History:  
Diagnosis Date  Abnormal stress echo-suggested anterior ischemia 2011  Arthritis   
 knees  CAD (coronary artery disease) [address] [place] [address]  Chronic kidney disease  Chronic systolic heart failure ([facility] [address]  CKD (chronic kidney disease)  Dyskinesia 2016  GERD (gastroesophageal reflux disease)  Hepatitis History of  
 HTN (hypertension)  Hyperlipidemia  ICD (implantable cardiac defibrillator) in place 2012 [employer] dual-chamber ICD impalnted 8/28/12  Nonischemic cardiomyopathy ([facility] [address] 8/29/2012  Organic hypersomnia, unspecified  Orthostatic hypotension 2/17/2016  MARICHUY (obstructive sleep apnea)   
 cpap  Parkinson disease ([facility] [address] Past Surgical History:  
Procedure Laterality Date  CARDIAC SURG PROCEDURE UNLIST  COLONOSCOPY  2004, 2014  
 with polypectomy  HX IMPLANTABLE CARDIOVERTER DEFIBRILLATOR  2012  HX PTCA  2011 [address] HE[facility] 5-2011  HX TONSILLECTOMY    
 as child  HX UROLOGICAL  2012  
 prostate biopsy negative Social History Socioeconomic History  Marital status:  Spouse name: Not on file  Number of children: Not on file  Years of education: Not on file  Highest education level: Not on file Occupational History  Not on file Social Needs  Financial resource strain: Not on file  Food insecurity:  
  Worry: Not on file Inability: Not on file  Transportation needs:  
  Medical: Not on file Non-medical: Not on file Tobacco Use  Smoking status: Never Smoker  Smokeless tobacco: Never Used Substance and Sexual Activity  Alcohol use: Yes Alcohol/week: 1.0 oz Types: 2 Cans of beer per week  Drug use: No  
 Sexual activity: Yes  
  Partners: Female Lifestyle  Physical activity:  
  Days per week: Not on file Minutes per session: Not on file  Stress: Not on file Relationships  Social connections:  
  Talks on phone: Not on file Gets together: Not on file Attends [employer] service: Not on file Active member of club or organization: Not on file Attends meetings of clubs or organizations: Not on file Relationship status: Not on file  Intimate partner violence:  
  Fear of current or ex partner: Not on file Emotionally abused: Not on file Physically abused: Not on file Forced sexual activity: Not on file Other Topics Concern  Not on file Social History Narrative  Not on file Family History Problem Relation Age of Onset  Heart Disease Mother  Heart Attack Mother 80 MI  
 Heart Failure Mother 80  
 Other Mother   
     renal failure  Elevated Lipids Mother  Hypertension Mother  Diabetes Mother  Heart Disease Father  Heart Attack Father 68 MI  
 Stroke Father  Hypertension Father  Elevated Lipids Father  Hypertension Brother  Heart Disease Brother CAD/MI  
 Psychiatric Disorder Sister Bipolar Allergies Allergen Reactions  Statins-Hmg-Coa Reductase Inhibitors Myalgia Current Facility-Administered Medications Medication Dose Route Frequency  sodium chloride (NS) flush 5-40 mL  5-40 mL IntraVENous PRN  
 nitroglycerin (NITROSTAT) tablet 0.4 mg  0.4 mg SubLINGual Q5MIN PRN  
 acetaminophen (TYLENOL) tablet 650 mg  650 mg Oral Q4H PRN  
 HYDROcodone-acetaminophen (NORCO) 5-325 mg per tablet 1 Tab  1 Tab Oral Q4H PRN  
 morphine injection 2 mg  2 mg IntraVENous Q4H PRN  
 amiodarone (CORDARONE) 450 mg in dextrose 5% 250 mL infusion  0.5-1 mg/min IntraVENous TITRATE  sodium bicarbonate (8.4%) injection 50 mEq  50 mEq IntraVENous ONCE  
 
Objective:  
 
Vitals:  
 04/14/19 1122 04/14/19 1130 04/14/19 1141 04/14/19 1151 BP: (!) 87/56 (!) 88/59 115/52 (!) 84/52 Pulse: 86 70 88 (!) 59 Resp:  22 Temp:      
SpO2: 94% 97% 97% 98% Weight:      
Height: PHYSICAL EXAM  
 
Constitutional:  the patient is well developed and lethargic EENMT:  Sclera clear, pupils equal, oral mucosa moist 
Respiratory: rales bilaterally, 5L NC Cardiovascular:  RRR without M,G,R 
Gastrointestinal: soft and non-tender; with positive bowel sounds. Musculoskeletal: warm without cyanosis. There is 3+ lower extremity edema. Skin:  no jaundice or rashes, no wounds Neurologic: no gross neuro deficits Psychiatric:  Lethargic, opens eyes and follows commands, but slow, answers simple questions CXR:  Diffuse pulmonary edema, R>L Recent Labs 04/14/19 
1012 WBC 8.2 HGB 17.0 HCT 53.8*  
 Recent Labs 04/14/19 
1012 * K 4.2 CL 94* * CO2 20* BUN 68* CREA 3.37* MG 3.2*  
CA 8.6 ALB 3.3 TBILI 4.6* ALT 92* SGOT 68* No results for input(s): PH, PCO2, PO2, HCO3 in the last 72 hours. No results for input(s): LCAD, LAC in the last 72 hours. Assessment:  (Medical Decision Making) Hospital Problems  Date Reviewed: 4/3/2019 Codes Class Noted POA * (Principal) Acute on chronic systolic HF (heart failure) (HCC) ICD-10-CM: Y63.32 ICD-9-CM: 428.23  4/14/2019 Unknown NSVT (nonsustained ventricular tachycardia) (HCC) ICD-10-CM: I47.2 ICD-9-CM: 427.1  4/14/2019 Unknown Pleural effusion ICD-10-CM: J90 ICD-9-CM: 511.9  4/3/2019 Yes Hypotension ICD-10-CM: I95.9 ICD-9-CM: 458.9  4/1/2019 Yes Persistent atrial fibrillation (HCC) ICD-10-CM: I48.1 ICD-9-CM: 427.31  2/1/2018 Yes Hyperlipidemia ICD-10-CM: E78.5 ICD-9-CM: 272.4  1/14/2016 Yes Essential hypertension ICD-10-CM: I10 
ICD-9-CM: 401.9  1/8/2016 Yes Chronic kidney disease, stage III (moderate) (HCC) ICD-10-CM: N18.3 ICD-9-CM: 585.3  8/29/2012 Yes Overview Signed 8/29/2012  8:38 AM by Olga Montano Baseline Cr 1.6-1.7 Coronary atherosclerosis of native coronary artery ICD-10-CM: I25.10 ICD-9-CM: 414.01  5/11/2011 Yes 78 y/o male with ischemic cardiomyopathy with apparent grossly decompensated heart failure, likely congestive hepatopathy, ZAY, appears volume overloaded BEDSIDE ECHO by me felt to see severely reduced EF, ~10% Plan:  (Medical Decision Making) --heart failure per cardiology --consider lasix gtt 
--consider pressors if needed for support --check abdominal ultrasound, but suspect congestive hepatopathy 
--can assess for thoracentesis when able to sit up 
--bicarb will likely improve with diuresis, discordant ABG vs chemistry bicarb 
--consider nephro consult if renal function not responsive to diuresis --he appears to be in cardiogenic shock and will discuss with cardiology plans 
--doubt sepsis, but will check cultures and PCt 
--hold Eliquis with coagulopathy Full Code More than 50% of the time documented was spent in face-to-face contact with the patient and in the care of the patient on the floor/unit where the patient is located. Thank you very much for this referral.  We appreciate the opportunity to participate in this patient's care. Will follow along with above stated plan.  
 
Adi Downing MD

## 2019-04-14 NOTE — H&P (VIEW-ONLY)
CONSULT NOTE Modesto Houser 4/14/2019 Date of Admission:  4/14/2019 The patient's chart is reviewed and the patient is discussed with the staff. Subjective:  
 
Patient is a 77 y.o.  male seen and evaluated at the request of Dr. Lucien Lloyd. He is a 76 y/o male history of ischmic cardiomyopathy with ICD, persistent afib on Eliquis, Parkinson and MARICHUY whom I saw in the pulmonary clinic just 11 days ago for a new R pleural effusion. He underwent thoracentesis and had 2200 removed from the right thorax and it was transudative by labs and cyto and cultures were negative. He presented to the ED today via EMS on CPAP in AFib with RVR and having runs of NSVT. It was noted he was in renal failure and biliribin also had significantly increased and cardiology saw and evaluated and felt he was in acute on chronic heart failure. He is on [facility] in the ICU now when I was consulted for critical care management. He is sleepy and awakens to voice, but doesn't participate in interview. Review of Systems Review of systems not obtained due to patient factors. Patient Active Problem List  
Diagnosis Code  Coronary atherosclerosis of native coronary artery I25.10  Post PTCA-3.5 x 18mm Xience to mid LAD-5/11/11 Z98.61  
 MARICHUY (obstructive sleep apnea) G47.33  
 Abnormal stress echo-suggested anterior ischemia R94.39  
 Automatic implantable cardioverter-defibrillator in situ Z95.810  
 Nonischemic cardiomyopathy ([facility] [address] I42.8  Chronic kidney disease, stage III (moderate) (HCC) N18.3  Chronic systolic heart failure (HCC) I50.22  
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  
 Hyperlipidemia E78.5  Medicare annual wellness visit, subsequent Z00.00  
 Orthostatic hypotension I95.1  Tremor R25.1  Neurologic orthostatic hypotension (HCC) G90.3  Encounter for long-term (current) use of high-risk medication Z79.899  Dyskinesia due to Parkinson's disease ([facility] [address] G24.9, G20  
 Parkinson disease ([facility] [address] G20  
 Persistent atrial fibrillation ([facility] [address] I48.1  Elevated PSA R97.20  Elevated TSH R79.89  
 Anticoagulant long-term use Z79.01  
 Hypotension I95.9  Pleural effusion J90  
 Acute on chronic systolic HF (heart failure) ([facility]) I50.23  
 NSVT (nonsustained ventricular tachycardia) ([facility]) I47.2 Prior to Admission Medications Prescriptions Last Dose Informant Patient Reported? Taking? LORazepam (ATIVAN) 0.5 mg tablet   No No  
Sig: Take 1 Tab by mouth every eight (8) hours as needed for Anxiety. Max Daily Amount: 1.5 mg.  
apixaban (ELIQUIS) 5 mg tablet   No No  
Sig: Take 1 Tab by mouth two (2) times a day. aspirin delayed-release 81 mg tablet   Yes No  
Sig: Take  by mouth daily. carbidopa-levodopa (SINEMET)  mg per tablet   No No  
Si.5 tabs 8am, 1pm, 6pm and 10pm  
cholecalciferol, vitamin D3, (VITAMIN D3) 2,000 unit Tab   Yes No  
Sig: Take  by mouth daily. cpap machine kit   Yes No  
Sig: by Does Not Apply route. 10cm  
cyanocobalamin 1,000 mcg tablet   Yes No  
Sig: Take 1,000 mcg by mouth daily. nitroglycerin (NITROSTAT) 0.4 mg SL tablet   No No  
Si Tab by SubLINGual route every five (5) minutes as needed. pitavastatin calcium (LIVALO) 2 mg tablet   No No  
Sig: Take 1 Tab by mouth daily. rasagiline (AZILECT) 1 mg tablet   No No  
Sig: Take 1 Tab by mouth daily. Facility-Administered Medications: None Past Medical History:  
Diagnosis Date  Abnormal stress echo-suggested anterior ischemia 2011  Arthritis   
 knees  CAD (coronary artery disease) [address] [place] [address]  Chronic kidney disease  Chronic systolic heart failure ([facility] [address]  CKD (chronic kidney disease)  Dyskinesia 2016  GERD (gastroesophageal reflux disease)  Hepatitis History of  
 HTN (hypertension)  Hyperlipidemia  ICD (implantable cardiac defibrillator) in place 2012 [place] dual-chamber ICD impalnted 8/28/12  Nonischemic cardiomyopathy ([facility] [address] 8/29/2012  Organic hypersomnia, unspecified  Orthostatic hypotension 2/17/2016  MARICHUY (obstructive sleep apnea)   
 cpap  Parkinson disease ([facility] [address] Past Surgical History:  
Procedure Laterality Date  CARDIAC SURG PROCEDURE UNLIST  COLONOSCOPY  2004, 2014  
 with polypectomy  HX IMPLANTABLE CARDIOVERTER DEFIBRILLATOR  2012  HX PTCA  2011 [address] HEART 5-2011  HX TONSILLECTOMY    
 as child  HX UROLOGICAL  2012  
 prostate biopsy negative Social History Socioeconomic History  Marital status:  Spouse name: Not on file  Number of children: Not on file  Years of education: Not on file  Highest education level: Not on file Occupational History  Not on file Social Needs  Financial resource strain: Not on file  Food insecurity:  
  Worry: Not on file Inability: Not on file  Transportation needs:  
  Medical: Not on file Non-medical: Not on file Tobacco Use  Smoking status: Never Smoker  Smokeless tobacco: Never Used Substance and Sexual Activity  Alcohol use: Yes Alcohol/week: 1.0 oz Types: 2 Cans of beer per week  Drug use: No  
 Sexual activity: Yes  
  Partners: Female Lifestyle  Physical activity:  
  Days per week: Not on file Minutes per session: Not on file  Stress: Not on file Relationships  Social connections:  
  Talks on phone: Not on file Gets together: Not on file Attends L[employer] day service: Not on file Active member of club or organization: Not on file Attends meetings of clubs or organizations: Not on file Relationship status: Not on file  Intimate partner violence:  
  Fear of current or ex partner: Not on file Emotionally abused: Not on file Physically abused: Not on file Forced sexual activity: Not on file Other Topics Concern  Not on file Social History Narrative  Not on file Family History Problem Relation Age of Onset  Heart Disease Mother  Heart Attack Mother 80 MI  
 Heart Failure Mother 80  
 Other Mother   
     renal failure  Elevated Lipids Mother  Hypertension Mother  Diabetes Mother  Heart Disease Father  Heart Attack Father 68 MI  
 Stroke Father  Hypertension Father  Elevated Lipids Father  Hypertension Brother  Heart Disease Brother CAD/MI  
 Psychiatric Disorder Sister Bipolar Allergies Allergen Reactions  Statins-Hmg-Coa Reductase Inhibitors Myalgia Current Facility-Administered Medications Medication Dose Route Frequency  sodium chloride (NS) flush 5-40 mL  5-40 mL IntraVENous PRN  
 nitroglycerin (NITROSTAT) tablet 0.4 mg  0.4 mg SubLINGual Q5MIN PRN  
 acetaminophen (TYLENOL) tablet 650 mg  650 mg Oral Q4H PRN  
 HYDROcodone-acetaminophen (NORCO) 5-325 mg per tablet 1 Tab  1 Tab Oral Q4H PRN  
 morphine injection 2 mg  2 mg IntraVENous Q4H PRN  
 amiodarone (CORDARONE) 450 mg in dextrose 5% 250 mL infusion  0.5-1 mg/min IntraVENous TITRATE  sodium bicarbonate (8.4%) injection 50 mEq  50 mEq IntraVENous ONCE  
 
Objective:  
 
Vitals:  
 04/14/19 1122 04/14/19 1130 04/14/19 1141 04/14/19 1151 BP: (!) 87/56 (!) 88/59 115/52 (!) 84/52 Pulse: 86 70 88 (!) 59 Resp:  22 Temp:      
SpO2: 94% 97% 97% 98% Weight:      
Height: PHYSICAL EXAM  
 
Constitutional:  the patient is well developed and lethargic EENMT:  Sclera clear, pupils equal, oral mucosa moist 
Respiratory: rales bilaterally, 5L NC Cardiovascular:  RRR without M,G,R 
Gastrointestinal: soft and non-tender; with positive bowel sounds. Musculoskeletal: warm without cyanosis. There is 3+ lower extremity edema. Skin:  no jaundice or rashes, no wounds Neurologic: no gross neuro deficits Psychiatric:  Lethargic, opens eyes and follows commands, but slow, answers simple questions CXR:  Diffuse pulmonary edema, R>L Recent Labs 04/14/19 
1012 WBC 8.2 HGB 17.0 HCT 53.8*  
 Recent Labs 04/14/19 
1012 * K 4.2 CL 94* * CO2 20* BUN 68* CREA 3.37* MG 3.2*  
CA 8.6 ALB 3.3 TBILI 4.6* ALT 92* SGOT 68* No results for input(s): PH, PCO2, PO2, HCO3 in the last 72 hours. No results for input(s): LCAD, LAC in the last 72 hours. Assessment:  (Medical Decision Making) Hospital Problems  Date Reviewed: 4/3/2019 Codes Class Noted POA * (Principal) Acute on chronic systolic HF (heart failure) (HCC) ICD-10-CM: V16.71 ICD-9-CM: 428.23  4/14/2019 Unknown NSVT (nonsustained ventricular tachycardia) (HCC) ICD-10-CM: I47.2 ICD-9-CM: 427.1  4/14/2019 Unknown Pleural effusion ICD-10-CM: J90 ICD-9-CM: 511.9  4/3/2019 Yes Hypotension ICD-10-CM: I95.9 ICD-9-CM: 458.9  4/1/2019 Yes Persistent atrial fibrillation (HCC) ICD-10-CM: I48.1 ICD-9-CM: 427.31  2/1/2018 Yes Hyperlipidemia ICD-10-CM: E78.5 ICD-9-CM: 272.4  1/14/2016 Yes Essential hypertension ICD-10-CM: I10 
ICD-9-CM: 401.9  1/8/2016 Yes Chronic kidney disease, stage III (moderate) (HCC) ICD-10-CM: N18.3 ICD-9-CM: 585.3  8/29/2012 Yes Overview Signed 8/29/2012  8:38 AM by Rikki Freeman Baseline Cr 1.6-1.7 Coronary atherosclerosis of native coronary artery ICD-10-CM: I25.10 ICD-9-CM: 414.01  5/11/2011 Yes 78 y/o male with ischemic cardiomyopathy with apparent grossly decompensated heart failure, likely congestive hepatopathy, ZAY, appears volume overloaded BEDSIDE ECHO by me felt to see severely reduced EF, ~10% Plan:  (Medical Decision Making) --heart failure per cardiology --consider lasix gtt 
--consider pressors if needed for support --check abdominal ultrasound, but suspect congestive hepatopathy 
--can assess for thoracentesis when able to sit up 
--bicarb will likely improve with diuresis, discordant ABG vs chemistry bicarb 
--consider nephro consult if renal function not responsive to diuresis --he appears to be in cardiogenic shock and will discuss with cardiology plans 
--doubt sepsis, but will check cultures and PCt 
--hold Eliquis with coagulopathy Full Code More than 50% of the time documented was spent in face-to-face contact with the patient and in the care of the patient on the floor/unit where the patient is located. Thank you very much for this referral.  We appreciate the opportunity to participate in this patient's care. Will follow along with above stated plan.  
 
Mary Cardoza MD

## 2019-04-14 NOTE — H&P
Patient was seen and examined in the ER. H&P transcribed by Mrs Lionel Reed. I agree with her assessment and plan. The patient appears to have acute on chronic  Systolic CHF,respiratory failure,liver failure, confusion,and renal failure. He has Afib with RVR and NSVT vs aberracy. He is being admitted to ICU. Critical Medicine is being consulted. ICD will be interrogated. Patient appears critically ill. No family members are presently at bedside.

## 2019-04-14 NOTE — DISCHARGE SUMMARY
[facility] Cardiology Discharge Summary Patient ID: 
Loren Corona [id number] 
37 y.o. 
1950 Admit date: 4/14/2019 Discharge date:  04/14/2019 Admitting Physician: Natalia Vu MD  
 
Discharge Physician: Jori Yan NP/Dr. Maria Luisa Delatorre Admission Diagnoses: Acute on chronic systolic HF (heart failure) ([facility] [address] [I50.23] Discharge Diagnoses:  
 Diagnosis  Acute on chronic systolic HF (heart failure) ([facility] [address]  NSVT (nonsustained ventricular tachycardia) (HCC)  Acute hypoxemic respiratory failure (HCC)  Cardiogenic shock (HCC)  [facility]  Pleural effusion  Hypotension  Anticoagulant long-term use  Persistent atrial fibrillation (HCC)  Dyskinesia due to Parkinson's disease ([facility]  Parkinson disease ([facility]  Tremor  Hyperlipidemia  Gastroesophageal reflux disease without esophagitis  Essential hypertension  Organic hypersomnia, unspecified  Automatic implantable cardioverter-defibrillator in situ  Nonischemic cardiomyopathy ([facility]  Chronic kidney disease, stage III (moderate) ([facility])  Coronary atherosclerosis of native coronary artery  Post PTCA-3.5 x 18mm Xience to mid LAD-5/11/11 Loren Corona is a 77 y.o. male with a PMH of CAD (PCI 2011-LHC 2018 mod MVD and patent LAD stent), HFrEF (EF 20-25%), [place] ICD, HTN, HLD, CKD, and persistent a fib,, who presented to the ED with worsening shortness of breath. In the ED he is noted to be in A fib with RVR and having runs of NSVT. CXR shows pulmonary edema with bilateral pleural effusions, R>L. Labs show LA 9.74, WBC 8.2, H&H 17/53.8, plt 173,  Na 134, K 4.2, BUN 68, creatinine 3.37, BNP 4202, AST 92, ALT 68, and troponin 0.08. Patient taken to CCU and continued to decline. Patient was noted to be in VT and Code Blue was called and CPR initiated.   Patient was treated with multiple Epi and patient received multiple defibrillations from ICD. Discussion with family regarding very poor prognosis in the setting of MSOF and resuscitation was stopped and magnet was placed over ICD to stop defibrillation. TOD [id number].    
 
 
 
Signed: 
Lona Paulson, NP 
4/14/2019 
4:38 PM

## 2019-04-14 NOTE — PROGRESS NOTES
CODE BLUE NOTE. Patient was admitted by cardiology team in view of end stage heart failure, congestive hepatopathy, makayla. Dr. Tanja Anaya and Dr. Ng Centers present. At 1412 hrs he was noted with VTach, no pulse. The patient has a defibrillator and received multiple shocks during CPR. He received a total of 2 round of epinephrine. In between CPR he had spontaneous movement of upper and lower extremities and briefly ROSC for a few seconds, then he would be shocked by his defibrillator and CPR resumed. This happened at least 3 times. Wife was informed and she decided to made patient DNR, DNI. CPR stopped at 1430 hrs. Magnet applied to internal defibrillator.

## 2019-04-14 NOTE — PROCEDURES
Procedure: 
[employer] Insertion CPT - 91835 Indication: 
Cardiogenic shock Chlorohexidine Skin Antiseptic: Yes Hand Hygiene: Yes 
Maximal Barrier Precautions: Yes Optimal Catheter Site Selection: Yes 
Sterile Ultrasound Technique: Yes Location: 
right 
internal jugular Time out done and correct patient/location for procedure. Area prepped and sterilized with chlorhexedine. Sterile drapes applied. Patient given local lidocane for anesthesia. Using Seldinger technique quad lumen lumen catheter inserted. Guidewire removed. All ports with blood return and lines flushed. Line sutured in place. Patient tolerated procedure well, no complications. Estimated Blood loss: 2cc Poncho Chapman MD

## 2019-04-14 NOTE — PROGRESS NOTES
Critical Care Daily Progress Note: 4/14/2019 Ladan Tolentino Admission Date: 4/14/2019 The patient's chart is reviewed and the patient is discussed with the staff. Patient is a 77 y.o.  male seen and evaluated at the request of Dr. Lizbeth To. He is a 78 y/o male history of ischmic cardiomyopathy with ICD, persistent afib on Eliquis, Parkinson and MARICHUY whom I saw in the pulmonary clinic just 11 days ago for a new R pleural effusion. He underwent thoracentesis and had 2200 removed from the right thorax and it was transudative by labs and cyto and cultures were negative. He presented to the ED today via EMS on CPAP in AFib with RVR and having runs of NSVT. It was noted he was in renal failure and biliribin also had significantly increased and cardiology saw and evaluated and felt he was in acute on chronic heart failure. He is on [facility] in the ICU now when I was consulted for critical care management. He is sleepy and awakens to voice, but doesn't participate in interview. Subjective:  
 
Responded to code blue. At 1412 hrs he was noted with VTach, no pulse. The patient has a defibrillator and received multiple shocks during CPR. He received a total of 2 round of epinephrine. In between CPR he had spontaneous movement of upper and lower extremities and briefly ROSC for a few seconds, then he would be shocked by his defibrillator and CPR resumed. This happened at least 3 times. Wife was informed and she decided to made patient DNR, DNI. CPR stopped at 1430 hrs. Magnet applied to internal defibrillator. Dr. Lizbeth To is at bedside and he is communicating with his wife. Total time spent with this patient at least 30 minutes.  
 
 
Jim Alberto MD

## 2019-04-14 NOTE — PROGRESS NOTES
Patient  with wife and son present Multiple attempts to contact  at [facility] and Bailey Torres's Family understood the difficulty to get  in this crisis Prayer was offered Vianey Vaz, staff Nathaly germain [address], [address]  /   [email or web address]

## 2019-04-14 NOTE — ED PROVIDER NOTES
Patient presents to the ER with wife for difficulty breathing. Patient apparently began to report shortness of breath last evening and difficulty breathing. His wife believes his symptoms worsened. Today, he became ill appearing, alert, responsive. EMS was called. Upon arrival, patient was tachypnea and mildly hypoxic. Appeared to be in A. Fib with RVR, in route. Patient appeared to have significant amount of ectopy, possible runs of V. Tach, nonsustained. Wife reports patient had a recent thoracentesis for pleural effusion. Patient does have a defibrillator in place, denies any recent discharges. The history is provided by the EMS personnel, the patient and the spouse. Shortness of Breath This is a new problem. The problem occurs frequently. The current episode started yesterday. The problem has been gradually worsening. Associated symptoms include leg swelling. Pertinent negatives include no fever, no cough, no sputum production, no vomiting and no leg pain. Associated medical issues include heart failure. Past Medical History:  
Diagnosis Date  Abnormal stress echo-suggested anterior ischemia 5/12/2011  Arthritis   
 knees  CAD (coronary artery disease) [address] [place] [address]  Chronic kidney disease  Chronic systolic heart failure ([facility] [address]  CKD (chronic kidney disease)  Dyskinesia 2/17/2016  GERD (gastroesophageal reflux disease)  Hepatitis History of  
 HTN (hypertension)  Hyperlipidemia  ICD (implantable cardiac defibrillator) in place 2012 [employer] dual-chamber ICD impalnted 8/28/12  Nonischemic cardiomyopathy ([address] 8/29/2012  Organic hypersomnia, unspecified  Orthostatic hypotension 2/17/2016  MARICHUY (obstructive sleep apnea)   
 cpap  Parkinson disease ([facility] [address] Past Surgical History:  
Procedure Laterality Date  CARDIAC SURG PROCEDURE UNLIST  COLONOSCOPY  2004, 2014  
 with polypectomy  HX IMPLANTABLE CARDIOVERTER DEFIBRILLATOR  2012  HX PTCA  2011 [address] [facility] 5-2011  HX TONSILLECTOMY    
 as child  HX UROLOGICAL  2012  
 prostate biopsy negative Family History:  
Problem Relation Age of Onset  Heart Disease Mother  Heart Attack Mother 80 MI  
 Heart Failure Mother 80  
 Other Mother   
     renal failure  Elevated Lipids Mother  Hypertension Mother  Diabetes Mother  Heart Disease Father  Heart Attack Father 68 MI  
 Stroke Father  Hypertension Father  Elevated Lipids Father  Hypertension Brother  Heart Disease Brother CAD/MI  
 Psychiatric Disorder Sister Bipolar Social History Socioeconomic History  Marital status:  Spouse name: Not on file  Number of children: Not on file  Years of education: Not on file  Highest education level: Not on file Occupational History  Not on file Social Needs  Financial resource strain: Not on file  Food insecurity:  
  Worry: Not on file Inability: Not on file  Transportation needs:  
  Medical: Not on file Non-medical: Not on file Tobacco Use  Smoking status: Never Smoker  Smokeless tobacco: Never Used Substance and Sexual Activity  Alcohol use: Yes Alcohol/week: 1.0 oz Types: 2 Cans of beer per week  Drug use: No  
 Sexual activity: Yes  
  Partners: Female Lifestyle  Physical activity:  
  Days per week: Not on file Minutes per session: Not on file  Stress: Not on file Relationships  Social connections:  
  Talks on phone: Not on file Gets together: Not on file Attends Hoahaoism service: Not on file Active member of club or organization: Not on file Attends meetings of clubs or organizations: Not on file Relationship status: Not on file  Intimate partner violence:  
  Fear of current or ex partner: Not on file Emotionally abused: Not on file Physically abused: Not on file Forced sexual activity: Not on file Other Topics Concern  Not on file Social History Narrative  Not on file ALLERGIES: Statins-hmg-coa reductase inhibitors Review of Systems Constitutional: Negative for fever and unexpected weight change. HENT: Negative for congestion. Eyes: Negative for photophobia. Respiratory: Positive for shortness of breath. Negative for cough and sputum production. Cardiovascular: Positive for palpitations and leg swelling. Gastrointestinal: Negative for anal bleeding and vomiting. Endocrine: Negative for polydipsia and polyuria. Genitourinary: Negative for flank pain. Musculoskeletal: Negative for back pain and gait problem. Skin: Negative for pallor. Neurological: Positive for weakness. Negative for seizures and numbness. Hematological: Negative for adenopathy. Does not bruise/bleed easily. Psychiatric/Behavioral: Negative for behavioral problems and confusion. All other systems reviewed and are negative. Vitals:  
 04/14/19 1004 04/14/19 1013 BP: 118/52 Pulse: (!) 127 (!) 138 Resp: (!) 35 Temp: 97.5 °F (36.4 °C) SpO2: 97% Weight: 77.1 kg (170 lb) Height: 5' 8\" (1.727 m) Physical Exam  
Constitutional: He appears well-developed and well-nourished. He appears lethargic. He has a sickly appearance. HENT:  
Head: Normocephalic and atraumatic. Eyes: Pupils are equal, round, and reactive to light. Conjunctivae and EOM are normal.  
Neck: Normal range of motion. Neck supple. No tracheal deviation present. No thyromegaly present. Cardiovascular: An irregularly irregular rhythm present. Tachycardia present. Pulmonary/Chest: Effort normal. He has decreased breath sounds in the right middle field and the right lower field. Abdominal: Soft. Bowel sounds are normal. He exhibits no distension. There is no tenderness. Musculoskeletal: He exhibits edema. Neurological: He appears lethargic. No cranial nerve deficit. Coordination normal.  
Nursing note and vitals reviewed. MDM Number of Diagnoses or Management Options Diagnosis management comments: Patient appears to be in between A. Fib with RVR. Nonsustained V. Tach. Will start amiodarone here with magnesium as well. Mental status is fair at this time, patient is not hypotensive 10:36 AM 
Patient appears to be in continuous A. Fib currently at the administration of amiodarone, we'll continue magnesium as well as amiodarone infusion. 11:03 AM 
Labs are significant for an increased creatinine 2. 3.37 from 2.8. Normal magnesium, normal potassium. Active acid is elevated at 9.8, likely related to hypoperfusion as opposed to sepsis. Normal white count ABG shows PH 7.3, PCO2 24, PaO2 of 187. Patient has been transitioned to 5 L nasal cannula. Chest x-ray shows bilateral effusions, right renal 
 
Case has been discussed with cardiology, they will come and evaluate patient. Clinically, patient shown some improvement, mentation appears better. Blood pressure remained stable. Amount and/or Complexity of Data Reviewed Clinical lab tests: ordered and reviewed Tests in the radiology section of CPT®: ordered and reviewed Risk of Complications, Morbidity, and/or Mortality Presenting problems: high Diagnostic procedures: high Management options: high Critical Care Total time providing critical care: 30-74 minutes (Critical Care Time 90 Minutes: Excluding all billable procedures, time spent at the bedside directing patients resuscitation, updating family, and coordinating care with consultants ) Procedures Results Include: 
 
Recent Results (from the past 24 hour(s)) POC TROPONIN-I Collection Time: 04/14/19 10:10 AM  
Result Value Ref Range Troponin-I (POC) 0.08 (H) 0.02 - 0.05 ng/ml CBC WITH AUTOMATED DIFF  Collection Time: 04/14/19 10:12 AM  
 Result Value Ref Range WBC 8.2 4.3 - 11.1 K/uL  
 RBC 5.52 4.23 - 5.6 M/uL  
 HGB 17.0 13.6 - 17.2 g/dL HCT 53.8 (H) 41.1 - 50.3 % MCV 97.5 79.6 - 97.8 FL  
 MCH 30.8 26.1 - 32.9 PG  
 MCHC 31.6 31.4 - 35.0 g/dL  
 RDW 15.8 (H) 11.9 - 14.6 % PLATELET 338 615 - 697 K/uL MPV 11.3 9.4 - 12.3 FL ABSOLUTE NRBC 0.00 0.0 - 0.2 K/uL  
 DF AUTOMATED NEUTROPHILS 70 43 - 78 % LYMPHOCYTES 17 13 - 44 % MONOCYTES 10 4.0 - 12.0 % EOSINOPHILS 2 0.5 - 7.8 % BASOPHILS 1 0.0 - 2.0 % IMMATURE GRANULOCYTES 1 0.0 - 5.0 %  
 ABS. NEUTROPHILS 5.7 1.7 - 8.2 K/UL  
 ABS. LYMPHOCYTES 1.4 0.5 - 4.6 K/UL  
 ABS. MONOCYTES 0.8 0.1 - 1.3 K/UL  
 ABS. EOSINOPHILS 0.2 0.0 - 0.8 K/UL  
 ABS. BASOPHILS 0.1 0.0 - 0.2 K/UL  
 ABS. IMM. GRANS. 0.1 0.0 - 0.5 K/UL METABOLIC PANEL, COMPREHENSIVE Collection Time: 04/14/19 10:12 AM  
Result Value Ref Range Sodium 134 (L) 136 - 145 mmol/L Potassium 4.2 3.5 - 5.1 mmol/L Chloride 94 (L) 98 - 107 mmol/L  
 CO2 20 (L) 21 - 32 mmol/L Anion gap 20 (H) 7 - 16 mmol/L Glucose 127 (H) 65 - 100 mg/dL BUN 68 (H) 8 - 23 MG/DL Creatinine 3.37 (H) 0.8 - 1.5 MG/DL  
 GFR est AA 23 (L) >60 ml/min/1.73m2 GFR est non-AA 19 (L) >60 ml/min/1.73m2 Calcium 8.6 8.3 - 10.4 MG/DL Bilirubin, total 4.6 (H) 0.2 - 1.1 MG/DL  
 ALT (SGPT) 92 (H) 12 - 65 U/L  
 AST (SGOT) 68 (H) 15 - 37 U/L Alk. phosphatase 69 50 - 136 U/L Protein, total 6.9 6.3 - 8.2 g/dL Albumin 3.3 3.2 - 4.6 g/dL Globulin 3.6 (H) 2.3 - 3.5 g/dL A-G Ratio 0.9 (L) 1.2 - 3.5 MAGNESIUM Collection Time: 04/14/19 10:12 AM  
Result Value Ref Range Magnesium 3.2 (H) 1.8 - 2.4 mg/dL POC LACTIC ACID Collection Time: 04/14/19 10:12 AM  
Result Value Ref Range Lactic Acid (POC) 9.73 (H) 0.5 - 1.9 mmol/L  
GLUCOSE, POC Collection Time: 04/14/19 10:13 AM  
Result Value Ref Range Glucose (POC) 89 65 - 100 mg/dL POC G3  Collection Time: 04/14/19 10:19 AM  
 Result Value Ref Range Device: Non rebreather FIO2 (POC) 100 % pH (POC) 7.324 (L) 7.35 - 7.45    
 pCO2 (POC) 24.8 (L) 35 - 45 MMHG  
 pO2 (POC) 187 (H) 75 - 100 MMHG  
 HCO3 (POC) 12.9 (L) 22 - 26 MMOL/L  
 sO2 (POC) 100 (H) 95 - 98 % Base deficit (POC) 11 mmol/L Allens test (POC) YES Site RIGHT RADIAL Patient temp. 98.6 Specimen type (POC) ARTERIAL Performed by Paige   
 CO2, POC 14 MMOL/L Flow rate (POC) 15.000 L/min Critical value read back 00:01 COLLECT TIME 1,012 Voice dictation software was used during the making of this note. This software is not perfect and grammatical and other typographical errors may be present. This note has been proofread, but may still contain errors.  
Sarahi Wolff MD; 4/14/2019 @11:04 AM  
===================================================================

## 2019-04-14 NOTE — PROGRESS NOTES
Post-mortem care performed, all lines removed. Pts wedding band on left hand ring finger left on patient as he was placed in post-mortem bag.

## 2019-04-14 NOTE — H&P (VIEW-ONLY)
CONSULT NOTE Jayy Tran 4/14/2019 Date of Admission:  4/14/2019 The patient's chart is reviewed and the patient is discussed with the staff. Subjective:  
 
Patient is a 77 y.o.  male seen and evaluated at the request of Dr. Dirk Bender. He is a 78 y/o male history of ischmic cardiomyopathy with ICD, persistent afib on Eliquis, Parkinson and MARICHUY whom I saw in the pulmonary clinic just 11 days ago for a new R pleural effusion. He underwent thoracentesis and had 2200 removed from the right thorax and it was transudative by labs and cyto and cultures were negative. He presented to the ED today via EMS on CPAP in AFib with RVR and having runs of NSVT. It was noted he was in renal failure and biliribin also had significantly increased and cardiology saw and evaluated and felt he was in acute on chronic heart failure. He is on [facility] in the ICU now when I was consulted for critical care management. He is sleepy and awakens to voice, but doesn't participate in interview. Review of Systems Review of systems not obtained due to patient factors. Patient Active Problem List  
Diagnosis Code  Coronary atherosclerosis of native coronary artery I25.10  Post PTCA-3.5 x 18mm Xience to mid LAD-5/11/11 Z98.61  
 MARICHUY (obstructive sleep apnea) G47.33  
 Abnormal stress echo-suggested anterior ischemia R94.39  
 Automatic implantable cardioverter-defibrillator in situ Z95.810  
 Nonischemic cardiomyopathy ([facility] [address] I42.8  Chronic kidney disease, stage III (moderate) (HCC) N18.3  Chronic systolic heart failure (HCC) I50.22  
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  
 Hyperlipidemia E78.5  Medicare annual wellness visit, subsequent Z00.00  
 Orthostatic hypotension I95.1  Tremor R25.1  Neurologic orthostatic hypotension (HCC) G90.3  Encounter for long-term (current) use of high-risk medication Z79.899  Dyskinesia due to Parkinson's disease ([facility] [address] G24.9, G20  
 Parkinson disease ([facility] [address] G20  
 Persistent atrial fibrillation ([facility] [address] I48.1  Elevated PSA R97.20  Elevated TSH R79.89  
 Anticoagulant long-term use Z79.01  
 Hypotension I95.9  Pleural effusion J90  
 Acute on chronic systolic HF (heart failure) ([facility]) I50.23  
 NSVT (nonsustained ventricular tachycardia) ([facility]) I47.2 Prior to Admission Medications Prescriptions Last Dose Informant Patient Reported? Taking? LORazepam (ATIVAN) 0.5 mg tablet   No No  
Sig: Take 1 Tab by mouth every eight (8) hours as needed for Anxiety. Max Daily Amount: 1.5 mg.  
apixaban (ELIQUIS) 5 mg tablet   No No  
Sig: Take 1 Tab by mouth two (2) times a day. aspirin delayed-release 81 mg tablet   Yes No  
Sig: Take  by mouth daily. carbidopa-levodopa (SINEMET)  mg per tablet   No No  
Si.5 tabs 8am, 1pm, 6pm and 10pm  
cholecalciferol, vitamin D3, (VITAMIN D3) 2,000 unit Tab   Yes No  
Sig: Take  by mouth daily. cpap machine kit   Yes No  
Sig: by Does Not Apply route. 10cm  
cyanocobalamin 1,000 mcg tablet   Yes No  
Sig: Take 1,000 mcg by mouth daily. nitroglycerin (NITROSTAT) 0.4 mg SL tablet   No No  
Si Tab by SubLINGual route every five (5) minutes as needed. pitavastatin calcium (LIVALO) 2 mg tablet   No No  
Sig: Take 1 Tab by mouth daily. rasagiline (AZILECT) 1 mg tablet   No No  
Sig: Take 1 Tab by mouth daily. Facility-Administered Medications: None Past Medical History:  
Diagnosis Date  Abnormal stress echo-suggested anterior ischemia 2011  Arthritis   
 knees  CAD (coronary artery disease) [address] [place] [address]  Chronic kidney disease  Chronic systolic heart failure ([facility] [address]  CKD (chronic kidney disease)  Dyskinesia 2016  GERD (gastroesophageal reflux disease)  Hepatitis History of  
 HTN (hypertension)  Hyperlipidemia  ICD (implantable cardiac defibrillator) in place 2012 [employer] dual-chamber ICD impalnted 8/28/12  Nonischemic cardiomyopathy ([facility] [address] 8/29/2012  Organic hypersomnia, unspecified  Orthostatic hypotension 2/17/2016  MARICHUY (obstructive sleep apnea)   
 cpap  Parkinson disease ([facility] [address] Past Surgical History:  
Procedure Laterality Date  CARDIAC SURG PROCEDURE UNLIST  COLONOSCOPY  2004, 2014  
 with polypectomy  HX IMPLANTABLE CARDIOVERTER DEFIBRILLATOR  2012  HX PTCA  2011 [address] 5-2011  HX TONSILLECTOMY    
 as child  HX UROLOGICAL  2012  
 prostate biopsy negative Social History Socioeconomic History  Marital status:  Spouse name: Not on file  Number of children: Not on file  Years of education: Not on file  Highest education level: Not on file Occupational History  Not on file Social Needs  Financial resource strain: Not on file  Food insecurity:  
  Worry: Not on file Inability: Not on file  Transportation needs:  
  Medical: Not on file Non-medical: Not on file Tobacco Use  Smoking status: Never Smoker  Smokeless tobacco: Never Used Substance and Sexual Activity  Alcohol use: Yes Alcohol/week: 1.0 oz Types: 2 Cans of beer per week  Drug use: No  
 Sexual activity: Yes  
  Partners: Female Lifestyle  Physical activity:  
  Days per week: Not on file Minutes per session: Not on file  Stress: Not on file Relationships  Social connections:  
  Talks on phone: Not on file Gets together: Not on file Attends Gnosticist service: Not on file Active member of club or organization: Not on file Attends meetings of clubs or organizations: Not on file Relationship status: Not on file  Intimate partner violence:  
  Fear of current or ex partner: Not on file Emotionally abused: Not on file Physically abused: Not on file Forced sexual activity: Not on file Other Topics Concern  Not on file Social History Narrative  Not on file Family History Problem Relation Age of Onset  Heart Disease Mother  Heart Attack Mother 80 MI  
 Heart Failure Mother 80  
 Other Mother   
     renal failure  Elevated Lipids Mother  Hypertension Mother  Diabetes Mother  Heart Disease Father  Heart Attack Father 68 MI  
 Stroke Father  Hypertension Father  Elevated Lipids Father  Hypertension Brother  Heart Disease Brother CAD/MI  
 Psychiatric Disorder Sister Bipolar Allergies Allergen Reactions  Statins-Hmg-Coa Reductase Inhibitors Myalgia Current Facility-Administered Medications Medication Dose Route Frequency  sodium chloride (NS) flush 5-40 mL  5-40 mL IntraVENous PRN  
 nitroglycerin (NITROSTAT) tablet 0.4 mg  0.4 mg SubLINGual Q5MIN PRN  
 acetaminophen (TYLENOL) tablet 650 mg  650 mg Oral Q4H PRN  
 HYDROcodone-acetaminophen (NORCO) 5-325 mg per tablet 1 Tab  1 Tab Oral Q4H PRN  
 morphine injection 2 mg  2 mg IntraVENous Q4H PRN  
 amiodarone (CORDARONE) 450 mg in dextrose 5% 250 mL infusion  0.5-1 mg/min IntraVENous TITRATE  sodium bicarbonate (8.4%) injection 50 mEq  50 mEq IntraVENous ONCE  
 
Objective:  
 
Vitals:  
 04/14/19 1122 04/14/19 1130 04/14/19 1141 04/14/19 1151 BP: (!) 87/56 (!) 88/59 115/52 (!) 84/52 Pulse: 86 70 88 (!) 59 Resp:  22 Temp:      
SpO2: 94% 97% 97% 98% Weight:      
Height: PHYSICAL EXAM  
 
Constitutional:  the patient is well developed and lethargic EENMT:  Sclera clear, pupils equal, oral mucosa moist 
Respiratory: rales bilaterally, 5L NC Cardiovascular:  RRR without M,G,R 
Gastrointestinal: soft and non-tender; with positive bowel sounds. Musculoskeletal: warm without cyanosis. There is 3+ lower extremity edema. Skin:  no jaundice or rashes, no wounds Neurologic: no gross neuro deficits Psychiatric:  Lethargic, opens eyes and follows commands, but slow, answers simple questions CXR:  Diffuse pulmonary edema, R>L Recent Labs 04/14/19 
1012 WBC 8.2 HGB 17.0 HCT 53.8*  
 Recent Labs 04/14/19 
1012 * K 4.2 CL 94* * CO2 20* BUN 68* CREA 3.37* MG 3.2*  
CA 8.6 ALB 3.3 TBILI 4.6* ALT 92* SGOT 68* No results for input(s): PH, PCO2, PO2, HCO3 in the last 72 hours. No results for input(s): LCAD, LAC in the last 72 hours. Assessment:  (Medical Decision Making) Hospital Problems  Date Reviewed: 4/3/2019 Codes Class Noted POA * (Principal) Acute on chronic systolic HF (heart failure) (HCC) ICD-10-CM: W79.94 ICD-9-CM: 428.23  4/14/2019 Unknown NSVT (nonsustained ventricular tachycardia) (HCC) ICD-10-CM: I47.2 ICD-9-CM: 427.1  4/14/2019 Unknown Pleural effusion ICD-10-CM: J90 ICD-9-CM: 511.9  4/3/2019 Yes Hypotension ICD-10-CM: I95.9 ICD-9-CM: 458.9  4/1/2019 Yes Persistent atrial fibrillation (HCC) ICD-10-CM: I48.1 ICD-9-CM: 427.31  2/1/2018 Yes Hyperlipidemia ICD-10-CM: E78.5 ICD-9-CM: 272.4  1/14/2016 Yes Essential hypertension ICD-10-CM: I10 
ICD-9-CM: 401.9  1/8/2016 Yes Chronic kidney disease, stage III (moderate) (HCC) ICD-10-CM: N18.3 ICD-9-CM: 585.3  8/29/2012 Yes Overview Signed 8/29/2012  8:38 AM by Trav Sampson Baseline Cr 1.6-1.7 Coronary atherosclerosis of native coronary artery ICD-10-CM: I25.10 ICD-9-CM: 414.01  5/11/2011 Yes 76 y/o male with ischemic cardiomyopathy with apparent grossly decompensated heart failure, likely congestive hepatopathy, ZAY, appears volume overloaded Plan:  (Medical Decision Making) --heart failure per cardiology --consider lasix gtt 
--consider pressors if needed for support 
--check abdominal ultrasound, but suspect congestive hepatopathy --can assess for thoracentesis when able to sit up 
--bicarb will likely improve with diuresis, discordant ABG vs chemistry bicarb 
--consider nephro consult if renal function not responsive to diuresis --he appears to be in cardiogenic shock and will discuss with cardiology plans 
--doubt sepsis, but will check cultures and PCt 
--hold Eliquis with coagulopathy Full Code More than 50% of the time documented was spent in face-to-face contact with the patient and in the care of the patient on the floor/unit where the patient is located. Thank you very much for this referral.  We appreciate the opportunity to participate in this patient's care. Will follow along with above stated plan.  
 
Juliet Bales MD

## 2019-04-14 NOTE — ED TRIAGE NOTES
Patient from home vis EMS for SOB and CHF exacerbation. Fire department on scene first and patient put on c-pap. Upon EMs arrival Patient breathing 50 bpm. Lung sounds clear. Initial pressure SBP 80. Patient taken off c-pap and started on fluids. Pressure systolic 254. EMS reports run of v-tach. Patient has pacemake/defib implanted that has not shocked patient. Patient denies pain. Patient arrives on non-rebreather. Patient placed on zoll with pads. Dr Harlo Nissen at bedside.

## 2019-04-14 NOTE — INTERVAL H&P NOTE
H&P Update: 
Jered Kidd was seen and examined. History and physical has been reviewed. The patient has been examined.  There have been no significant clinical changes since the completion of the originally dated History and Physical.

## 2019-04-14 NOTE — PROGRESS NOTES
Per Dr Tracy Hernadez. Stop compressions and no intubation at this time. Treat with medications. Magnet applied to internal defibrillator.

## 2019-04-19 LAB
BACTERIA SPEC CULT: NORMAL
BACTERIA SPEC CULT: NORMAL
SERVICE CMNT-IMP: NORMAL
SERVICE CMNT-IMP: NORMAL

## 2019-05-06 LAB
FUNGUS CULTURE, RFCO2T: NORMAL
FUNGUS SMEAR, RFCO1T: NORMAL
FUNGUS SPEC CULT: NORMAL
FUNGUS STAIN, 188244: NORMAL
REFLEX TO ID, RFCO3T: NORMAL
SPECIMEN SOURCE: NORMAL
SPECIMEN SOURCE: NORMAL

## 2019-05-18 LAB
ACID FAST STN SPEC: NEGATIVE
MYCOBACTERIUM SPEC QL CULT: NEGATIVE
SPECIMEN PREPARATION: NORMAL
SPECIMEN SOURCE: NORMAL

## (undated) DEVICE — KIT THORCENT 8FR L5IN POLYUR W/ 18/22/25GA NDL 3 W STPCOCK

## (undated) DEVICE — STERILE POLYISOPRENE POWDER-FREE SURGICAL GLOVES: Brand: PROTEXIS

## (undated) DEVICE — GEL MEDC ULTRASOUND 5L -- REPLACED BY 326862